# Patient Record
Sex: FEMALE | Race: OTHER | NOT HISPANIC OR LATINO | URBAN - METROPOLITAN AREA
[De-identification: names, ages, dates, MRNs, and addresses within clinical notes are randomized per-mention and may not be internally consistent; named-entity substitution may affect disease eponyms.]

---

## 2018-02-07 VITALS
WEIGHT: 126.1 LBS | OXYGEN SATURATION: 100 % | HEART RATE: 88 BPM | RESPIRATION RATE: 18 BRPM | TEMPERATURE: 97 F | DIASTOLIC BLOOD PRESSURE: 80 MMHG | SYSTOLIC BLOOD PRESSURE: 183 MMHG

## 2018-02-07 PROCEDURE — 99285 EMERGENCY DEPT VISIT HI MDM: CPT | Mod: 25

## 2018-02-07 PROCEDURE — 93010 ELECTROCARDIOGRAM REPORT: CPT

## 2018-02-07 NOTE — ED ADULT TRIAGE NOTE - CHIEF COMPLAINT QUOTE
hypertension, pt for colonoscopy tomorrow been drinking the pre medication solution for colonoscopy been having high bp since 2pm today

## 2018-02-08 ENCOUNTER — INPATIENT (INPATIENT)
Facility: HOSPITAL | Age: 73
LOS: 0 days | Discharge: HOME | DRG: 313 | End: 2018-02-08
Attending: INTERNAL MEDICINE | Admitting: INTERNAL MEDICINE
Payer: COMMERCIAL

## 2018-02-08 ENCOUNTER — TRANSCRIPTION ENCOUNTER (OUTPATIENT)
Age: 73
End: 2018-02-08

## 2018-02-08 VITALS
OXYGEN SATURATION: 100 % | SYSTOLIC BLOOD PRESSURE: 110 MMHG | DIASTOLIC BLOOD PRESSURE: 60 MMHG | HEART RATE: 57 BPM | TEMPERATURE: 98 F | RESPIRATION RATE: 18 BRPM

## 2018-02-08 DIAGNOSIS — F41.9 ANXIETY DISORDER, UNSPECIFIED: ICD-10-CM

## 2018-02-08 DIAGNOSIS — Z98.890 OTHER SPECIFIED POSTPROCEDURAL STATES: Chronic | ICD-10-CM

## 2018-02-08 DIAGNOSIS — R07.89 OTHER CHEST PAIN: ICD-10-CM

## 2018-02-08 DIAGNOSIS — I10 ESSENTIAL (PRIMARY) HYPERTENSION: ICD-10-CM

## 2018-02-08 DIAGNOSIS — K21.9 GASTRO-ESOPHAGEAL REFLUX DISEASE WITHOUT ESOPHAGITIS: ICD-10-CM

## 2018-02-08 LAB
ALBUMIN SERPL ELPH-MCNC: 4 G/DL — SIGNIFICANT CHANGE UP (ref 3.3–5)
ALBUMIN SERPL ELPH-MCNC: 4 G/DL — SIGNIFICANT CHANGE UP (ref 3.3–5)
ALP SERPL-CCNC: 100 U/L — SIGNIFICANT CHANGE UP (ref 40–120)
ALP SERPL-CCNC: 82 U/L — SIGNIFICANT CHANGE UP (ref 40–120)
ALT FLD-CCNC: 15 U/L — SIGNIFICANT CHANGE UP (ref 10–45)
ALT FLD-CCNC: <5 U/L — LOW (ref 10–45)
ANION GAP SERPL CALC-SCNC: 7 MMOL/L — SIGNIFICANT CHANGE UP (ref 5–17)
ANION GAP SERPL CALC-SCNC: 7 MMOL/L — SIGNIFICANT CHANGE UP (ref 5–17)
ANION GAP SERPL CALC-SCNC: 8 MMOL/L — SIGNIFICANT CHANGE UP (ref 5–17)
APTT BLD: 33.6 SEC — SIGNIFICANT CHANGE UP (ref 27.5–37.4)
AST SERPL-CCNC: 23 U/L — SIGNIFICANT CHANGE UP (ref 10–40)
AST SERPL-CCNC: 65 U/L — HIGH (ref 10–40)
BASOPHILS NFR BLD AUTO: 0.6 % — SIGNIFICANT CHANGE UP (ref 0–2)
BASOPHILS NFR BLD AUTO: 0.7 % — SIGNIFICANT CHANGE UP (ref 0–2)
BILIRUB SERPL-MCNC: 0.7 MG/DL — SIGNIFICANT CHANGE UP (ref 0.2–1.2)
BILIRUB SERPL-MCNC: 0.8 MG/DL — SIGNIFICANT CHANGE UP (ref 0.2–1.2)
BUN SERPL-MCNC: 7 MG/DL — SIGNIFICANT CHANGE UP (ref 7–23)
BUN SERPL-MCNC: 8 MG/DL — SIGNIFICANT CHANGE UP (ref 7–23)
BUN SERPL-MCNC: 8 MG/DL — SIGNIFICANT CHANGE UP (ref 7–23)
CALCIUM SERPL-MCNC: 8.9 MG/DL — SIGNIFICANT CHANGE UP (ref 8.4–10.5)
CALCIUM SERPL-MCNC: 9.1 MG/DL — SIGNIFICANT CHANGE UP (ref 8.4–10.5)
CALCIUM SERPL-MCNC: 9.4 MG/DL — SIGNIFICANT CHANGE UP (ref 8.4–10.5)
CHLORIDE SERPL-SCNC: 100 MMOL/L — SIGNIFICANT CHANGE UP (ref 96–108)
CHLORIDE SERPL-SCNC: 105 MMOL/L — SIGNIFICANT CHANGE UP (ref 96–108)
CHLORIDE SERPL-SCNC: 91 MMOL/L — LOW (ref 96–108)
CHOLEST SERPL-MCNC: 196 MG/DL — SIGNIFICANT CHANGE UP (ref 10–199)
CHOLEST SERPL-MCNC: 202 MG/DL — HIGH (ref 10–199)
CHOLEST SERPL-MCNC: 209 MG/DL — HIGH (ref 10–199)
CK MB CFR SERPL CALC: 4.2 NG/ML — SIGNIFICANT CHANGE UP (ref 0–6.7)
CK MB CFR SERPL CALC: 4.7 NG/ML — SIGNIFICANT CHANGE UP (ref 0–6.7)
CK SERPL-CCNC: 140 U/L — SIGNIFICANT CHANGE UP (ref 25–170)
CO2 SERPL-SCNC: 26 MMOL/L — SIGNIFICANT CHANGE UP (ref 22–31)
CO2 SERPL-SCNC: 27 MMOL/L — SIGNIFICANT CHANGE UP (ref 22–31)
CO2 SERPL-SCNC: 29 MMOL/L — SIGNIFICANT CHANGE UP (ref 22–31)
CREAT SERPL-MCNC: 0.58 MG/DL — SIGNIFICANT CHANGE UP (ref 0.5–1.3)
CREAT SERPL-MCNC: 0.67 MG/DL — SIGNIFICANT CHANGE UP (ref 0.5–1.3)
CREAT SERPL-MCNC: 0.69 MG/DL — SIGNIFICANT CHANGE UP (ref 0.5–1.3)
EOSINOPHIL NFR BLD AUTO: 0.6 % — SIGNIFICANT CHANGE UP (ref 0–6)
EOSINOPHIL NFR BLD AUTO: 1.3 % — SIGNIFICANT CHANGE UP (ref 0–6)
GLUCOSE SERPL-MCNC: 108 MG/DL — HIGH (ref 70–99)
GLUCOSE SERPL-MCNC: 117 MG/DL — HIGH (ref 70–99)
GLUCOSE SERPL-MCNC: 93 MG/DL — SIGNIFICANT CHANGE UP (ref 70–99)
HBA1C BLD-MCNC: 5.5 % — SIGNIFICANT CHANGE UP (ref 4–5.6)
HCT VFR BLD CALC: 37.5 % — SIGNIFICANT CHANGE UP (ref 34.5–45)
HCT VFR BLD CALC: 37.6 % — SIGNIFICANT CHANGE UP (ref 34.5–45)
HDLC SERPL-MCNC: 54 MG/DL — SIGNIFICANT CHANGE UP (ref 40–125)
HDLC SERPL-MCNC: 60 MG/DL — SIGNIFICANT CHANGE UP (ref 40–125)
HDLC SERPL-MCNC: 61 MG/DL — SIGNIFICANT CHANGE UP (ref 40–125)
HGB BLD-MCNC: 12.6 G/DL — SIGNIFICANT CHANGE UP (ref 11.5–15.5)
HGB BLD-MCNC: 12.7 G/DL — SIGNIFICANT CHANGE UP (ref 11.5–15.5)
INR BLD: 1.04 — SIGNIFICANT CHANGE UP (ref 0.88–1.16)
LIDOCAIN IGE QN: 58 U/L — SIGNIFICANT CHANGE UP (ref 7–60)
LIPID PNL WITH DIRECT LDL SERPL: 128 MG/DL — SIGNIFICANT CHANGE UP
LIPID PNL WITH DIRECT LDL SERPL: 129 MG/DL — SIGNIFICANT CHANGE UP
LIPID PNL WITH DIRECT LDL SERPL: 136 MG/DL — HIGH
LYMPHOCYTES # BLD AUTO: 21.8 % — SIGNIFICANT CHANGE UP (ref 13–44)
LYMPHOCYTES # BLD AUTO: 36.9 % — SIGNIFICANT CHANGE UP (ref 13–44)
MAGNESIUM SERPL-MCNC: 1.9 MG/DL — SIGNIFICANT CHANGE UP (ref 1.6–2.6)
MAGNESIUM SERPL-MCNC: 1.9 MG/DL — SIGNIFICANT CHANGE UP (ref 1.6–2.6)
MCHC RBC-ENTMCNC: 31 PG — SIGNIFICANT CHANGE UP (ref 27–34)
MCHC RBC-ENTMCNC: 31.6 PG — SIGNIFICANT CHANGE UP (ref 27–34)
MCHC RBC-ENTMCNC: 33.6 G/DL — SIGNIFICANT CHANGE UP (ref 32–36)
MCHC RBC-ENTMCNC: 33.8 G/DL — SIGNIFICANT CHANGE UP (ref 32–36)
MCV RBC AUTO: 92.1 FL — SIGNIFICANT CHANGE UP (ref 80–100)
MCV RBC AUTO: 93.5 FL — SIGNIFICANT CHANGE UP (ref 80–100)
MONOCYTES NFR BLD AUTO: 6.5 % — SIGNIFICANT CHANGE UP (ref 2–14)
MONOCYTES NFR BLD AUTO: 6.6 % — SIGNIFICANT CHANGE UP (ref 2–14)
NEUTROPHILS NFR BLD AUTO: 55.3 % — SIGNIFICANT CHANGE UP (ref 43–77)
NEUTROPHILS NFR BLD AUTO: 69.7 % — SIGNIFICANT CHANGE UP (ref 43–77)
NT-PROBNP SERPL-SCNC: 134 PG/ML — SIGNIFICANT CHANGE UP (ref 0–300)
NT-PROBNP SERPL-SCNC: 434 PG/ML — HIGH (ref 0–300)
PLATELET # BLD AUTO: 253 K/UL — SIGNIFICANT CHANGE UP (ref 150–400)
PLATELET # BLD AUTO: 274 K/UL — SIGNIFICANT CHANGE UP (ref 150–400)
POTASSIUM SERPL-MCNC: 4.1 MMOL/L — SIGNIFICANT CHANGE UP (ref 3.5–5.3)
POTASSIUM SERPL-MCNC: 4.6 MMOL/L — SIGNIFICANT CHANGE UP (ref 3.5–5.3)
POTASSIUM SERPL-MCNC: SIGNIFICANT CHANGE UP MMOL/L (ref 3.5–5.3)
POTASSIUM SERPL-SCNC: 4.1 MMOL/L — SIGNIFICANT CHANGE UP (ref 3.5–5.3)
POTASSIUM SERPL-SCNC: 4.6 MMOL/L — SIGNIFICANT CHANGE UP (ref 3.5–5.3)
POTASSIUM SERPL-SCNC: SIGNIFICANT CHANGE UP MMOL/L (ref 3.5–5.3)
PROT SERPL-MCNC: 7.4 G/DL — SIGNIFICANT CHANGE UP (ref 6–8.3)
PROT SERPL-MCNC: 8.1 G/DL — SIGNIFICANT CHANGE UP (ref 6–8.3)
PROTHROM AB SERPL-ACNC: 11.6 SEC — SIGNIFICANT CHANGE UP (ref 9.8–12.7)
RBC # BLD: 4.02 M/UL — SIGNIFICANT CHANGE UP (ref 3.8–5.2)
RBC # BLD: 4.07 M/UL — SIGNIFICANT CHANGE UP (ref 3.8–5.2)
RBC # FLD: 12 % — SIGNIFICANT CHANGE UP (ref 10.3–16.9)
RBC # FLD: 12.2 % — SIGNIFICANT CHANGE UP (ref 10.3–16.9)
SODIUM SERPL-SCNC: 124 MMOL/L — LOW (ref 135–145)
SODIUM SERPL-SCNC: 136 MMOL/L — SIGNIFICANT CHANGE UP (ref 135–145)
SODIUM SERPL-SCNC: 140 MMOL/L — SIGNIFICANT CHANGE UP (ref 135–145)
TOTAL CHOLESTEROL/HDL RATIO MEASUREMENT: 3.3 RATIO — SIGNIFICANT CHANGE UP (ref 3.3–7.1)
TOTAL CHOLESTEROL/HDL RATIO MEASUREMENT: 3.5 RATIO — SIGNIFICANT CHANGE UP (ref 3.3–7.1)
TOTAL CHOLESTEROL/HDL RATIO MEASUREMENT: 3.6 RATIO — SIGNIFICANT CHANGE UP (ref 3.3–7.1)
TRIGL SERPL-MCNC: 61 MG/DL — SIGNIFICANT CHANGE UP (ref 10–149)
TRIGL SERPL-MCNC: 66 MG/DL — SIGNIFICANT CHANGE UP (ref 10–149)
TRIGL SERPL-MCNC: 72 MG/DL — SIGNIFICANT CHANGE UP (ref 10–149)
TROPONIN T SERPL-MCNC: 0.01 NG/ML — SIGNIFICANT CHANGE UP (ref 0–0.01)
TROPONIN T SERPL-MCNC: <0.01 NG/ML — SIGNIFICANT CHANGE UP (ref 0–0.01)
TSH SERPL-MCNC: 0.58 UIU/ML — SIGNIFICANT CHANGE UP (ref 0.35–4.94)
WBC # BLD: 6.8 K/UL — SIGNIFICANT CHANGE UP (ref 3.8–10.5)
WBC # BLD: 7.5 K/UL — SIGNIFICANT CHANGE UP (ref 3.8–10.5)
WBC # FLD AUTO: 6.8 K/UL — SIGNIFICANT CHANGE UP (ref 3.8–10.5)
WBC # FLD AUTO: 7.5 K/UL — SIGNIFICANT CHANGE UP (ref 3.8–10.5)

## 2018-02-08 PROCEDURE — 71046 X-RAY EXAM CHEST 2 VIEWS: CPT

## 2018-02-08 PROCEDURE — 93005 ELECTROCARDIOGRAM TRACING: CPT

## 2018-02-08 PROCEDURE — 99285 EMERGENCY DEPT VISIT HI MDM: CPT | Mod: 25

## 2018-02-08 PROCEDURE — 93306 TTE W/DOPPLER COMPLETE: CPT | Mod: 26

## 2018-02-08 PROCEDURE — 93306 TTE W/DOPPLER COMPLETE: CPT

## 2018-02-08 PROCEDURE — 99223 1ST HOSP IP/OBS HIGH 75: CPT | Mod: AI

## 2018-02-08 PROCEDURE — 75574 CT ANGIO HRT W/3D IMAGE: CPT

## 2018-02-08 PROCEDURE — 71046 X-RAY EXAM CHEST 2 VIEWS: CPT | Mod: 26

## 2018-02-08 PROCEDURE — 75574 CT ANGIO HRT W/3D IMAGE: CPT | Mod: 26

## 2018-02-08 RX ORDER — NEBIVOLOL HYDROCHLORIDE 5 MG/1
2.5 TABLET ORAL DAILY
Qty: 0 | Refills: 0 | Status: DISCONTINUED | OUTPATIENT
Start: 2018-02-08 | End: 2018-02-08

## 2018-02-08 RX ORDER — ASPIRIN/CALCIUM CARB/MAGNESIUM 324 MG
325 TABLET ORAL ONCE
Qty: 0 | Refills: 0 | Status: COMPLETED | OUTPATIENT
Start: 2018-02-08 | End: 2018-02-08

## 2018-02-08 RX ORDER — HEPARIN SODIUM 5000 [USP'U]/ML
5000 INJECTION INTRAVENOUS; SUBCUTANEOUS EVERY 8 HOURS
Qty: 0 | Refills: 0 | Status: DISCONTINUED | OUTPATIENT
Start: 2018-02-08 | End: 2018-02-08

## 2018-02-08 RX ORDER — PANTOPRAZOLE SODIUM 20 MG/1
40 TABLET, DELAYED RELEASE ORAL
Qty: 0 | Refills: 0 | Status: DISCONTINUED | OUTPATIENT
Start: 2018-02-08 | End: 2018-02-08

## 2018-02-08 RX ORDER — METOPROLOL TARTRATE 50 MG
5 TABLET ORAL ONCE
Qty: 0 | Refills: 0 | Status: COMPLETED | OUTPATIENT
Start: 2018-02-08 | End: 2018-02-08

## 2018-02-08 RX ORDER — SODIUM CHLORIDE 9 MG/ML
1000 INJECTION INTRAMUSCULAR; INTRAVENOUS; SUBCUTANEOUS ONCE
Qty: 0 | Refills: 0 | Status: COMPLETED | OUTPATIENT
Start: 2018-02-08 | End: 2018-02-08

## 2018-02-08 RX ADMIN — SODIUM CHLORIDE 1000 MILLILITER(S): 9 INJECTION INTRAMUSCULAR; INTRAVENOUS; SUBCUTANEOUS at 01:23

## 2018-02-08 RX ADMIN — HEPARIN SODIUM 5000 UNIT(S): 5000 INJECTION INTRAVENOUS; SUBCUTANEOUS at 15:51

## 2018-02-08 RX ADMIN — Medication 325 MILLIGRAM(S): at 04:01

## 2018-02-08 RX ADMIN — HEPARIN SODIUM 5000 UNIT(S): 5000 INJECTION INTRAVENOUS; SUBCUTANEOUS at 05:22

## 2018-02-08 NOTE — ED PROVIDER NOTE - MEDICAL DECISION MAKING DETAILS
supportive care in ED , HTN counseling and IVF as NPO for study supportive care in ED , HTN counseling and IVF as NPO for study albeit patient remains with intermittent cheast discomfort and trop .05 + discussed with cards ( patient sans any prior w/u ) + ekg no acute changes and asa given

## 2018-02-08 NOTE — PROGRESS NOTE ADULT - SUBJECTIVE AND OBJECTIVE BOX
Interventional Cardiology PA Adult Progress Note    Subjective Assessment:  Pt. seen and examined at bedside in the ER. Pt. denies any CP, SOB, dizziness and palpitations.  	  MEDICATIONS:      pantoprazole    Tablet 40 milliGRAM(s) Oral before breakfast  heparin  Injectable 5000 Unit(s) SubCutaneous every 8 hours      	    [PHYSICAL EXAM:  TELEMETRY:  T(C): 36.9 (02-08-18 @ 13:00), Max: 36.9 (02-08-18 @ 13:00)  HR: 68 (02-08-18 @ 13:00) (45 - 88)  BP: 159/68 (02-08-18 @ 13:00) (128/60 - 183/80)  RR: 17 (02-08-18 @ 13:00) (16 - 18)  SpO2: 100% (02-08-18 @ 13:00) (97% - 100%)  Wt(kg): --  I&O's Summary    Height (cm): 152.4 (02-08 @ 06:11)  Weight (kg): 57.2 (02-07 @ 23:39)  BMI (kg/m2): 24.6 (02-08 @ 06:11)  BSA (m2): 1.53 (02-08 @ 06:11)  Turner:  Central/PICC/Mid Line:                                               	    ECG:  	  RADIOLOGY:   DIAGNOSTIC TESTING:  [ ] Echocardiogram:  [ ]  Catheterization:  [ ] Stress Test:    [ ] CORNELIUS  OTHER: 	    LABS:	 	  CARDIAC MARKERS:                                  12.7   6.8   )-----------( 253      ( 08 Feb 2018 11:08 )             37.6     02-08    140  |  105  |  8   ----------------------------<  93  4.1   |  27  |  0.67    Ca    9.4      08 Feb 2018 11:08  Mg     1.9     02-08    TPro  7.4  /  Alb  4.0  /  TBili  0.7  /  DBili  <0.2  /  AST  23  /  ALT  15  /  AlkPhos  100  02-08    proBNP: Serum Pro-Brain Natriuretic Peptide: 434 pg/mL (02-08 @ 11:08)  Serum Pro-Brain Natriuretic Peptide: 134 pg/mL (02-08 @ 01:26)    Lipid Profile:   HgA1c: Hemoglobin A1C, Whole Blood: 5.5 % (02-08 @ 01:27)    TSH: Thyroid Stimulating Hormone, Serum: 0.578 uIU/mL (02-08 @ 11:08)    PT/INR - ( 08 Feb 2018 11:08 )   PT: 11.6 sec;   INR: 1.04          PTT - ( 08 Feb 2018 11:08 )  PTT:33.6 sec    ASSESSMENT/PLAN: 	        DVT ppx:  Dispo: Interventional Cardiology PA Adult Progress Note  CC: CP    Subjective Assessment:    Pt. seen and examined at bedside in the ER. Pt. denies any CP, SOB, dizziness and palpitations.    all 12 ROS otherwise negative per subjective HPI	  MEDICATIONS:      pantoprazole    Tablet 40 milliGRAM(s) Oral before breakfast  heparin  Injectable 5000 Unit(s) SubCutaneous every 8 hours        [PHYSICAL EXAM:  TELEMETRY:  T(C): 36.9 (02-08-18 @ 13:00), Max: 36.9 (02-08-18 @ 13:00)  HR: 68 (02-08-18 @ 13:00) (45 - 88)  BP: 159/68 (02-08-18 @ 13:00) (128/60 - 183/80)  RR: 17 (02-08-18 @ 13:00) (16 - 18)  SpO2: 100% (02-08-18 @ 13:00) (97% - 100%)  Wt(kg): --  I&O's Summary    Height (cm): 152.4 (02-08 @ 06:11)  Weight (kg): 57.2 (02-07 @ 23:39)  BMI (kg/m2): 24.6 (02-08 @ 06:11)  BSA (m2): 1.53 (02-08 @ 06:11)  Turner:  Central/PICC/Mid Line:                                         general:NAD  Neck: no JVD  CV: RRR, s1 and 2 present  Pulm: CTA B/L, no W/R/R  GI: soft, NT/ND, + BS X 4  Extremities: no clubbing, cyanosis and edema  Neuro: AO X 3, no focal deficits noted      	    ECG:  	  RADIOLOGY:   DIAGNOSTIC TESTING:  [ ] Echocardiogram:  [ ]  Catheterization:  [ ] Stress Test:    [ ] CORNELIUS  OTHER: 	    LABS:	 	  CARDIAC MARKERS:                                  12.7   6.8   )-----------( 253      ( 08 Feb 2018 11:08 )             37.6     02-08    140  |  105  |  8   ----------------------------<  93  4.1   |  27  |  0.67    Ca    9.4      08 Feb 2018 11:08  Mg     1.9     02-08    TPro  7.4  /  Alb  4.0  /  TBili  0.7  /  DBili  <0.2  /  AST  23  /  ALT  15  /  AlkPhos  100  02-08    proBNP: Serum Pro-Brain Natriuretic Peptide: 434 pg/mL (02-08 @ 11:08)  Serum Pro-Brain Natriuretic Peptide: 134 pg/mL (02-08 @ 01:26)    Lipid Profile:   HgA1c: Hemoglobin A1C, Whole Blood: 5.5 % (02-08 @ 01:27)    TSH: Thyroid Stimulating Hormone, Serum: 0.578 uIU/mL (02-08 @ 11:08)    PT/INR - ( 08 Feb 2018 11:08 )   PT: 11.6 sec;   INR: 1.04          PTT - ( 08 Feb 2018 11:08 )  PTT:33.6 sec    ASSESSMENT/PLAN: 	        DVT ppx:  Dispo:

## 2018-02-08 NOTE — CONSULT NOTE ADULT - SUBJECTIVE AND OBJECTIVE BOX
RUSH TURNER      Patient is a 72y old  Female who presents with a chief complaint of Chest pressure w/ elevated BP while prepping for Colonoscopy. (08 Feb 2018 06:16)      HPI:  71 y/o female with PMHx of HTN and GERD presents to [facility] ER this morning, 2/8/18, complaining of mild intermittent chest pressure radiating down right arm with elevated BP (pt. checked BP every 15 minutes 160/) while prepping for colonoscopy.  According to patient, she was in her usual good state of health until last night while prepping for colonoscopy, she began to experience anxiety while drinking gastrografin.  Pt. states she tasted sodium from the solution and began to experience anxiety w/chest pressure.   She checked her BP and noted it to elevated 160/70 and became worried since her BP is normally maintained (pt. routinely checks her  BP at least 3-4 times a day 130/60).  Pt. denies any cardiac history and reports last exercise stress test was done three months ago revealing normal findings.  Pt. denies SOB, palpitations, abdominal discomfort and N/V.  In ER ECG reveals NSR@78bpm with non specific ST-T wave changes, Troponin 0.015--> 0.05, CXR reveals no acute pathology seen.  /76 pt. given Lopressor 5mg IV X1 and NS bolus. Pt. admitted to [facility] for telemetry, ECG, serial CE, Echocardiogram and NPO for CTA of coronaries to rule out ACS 2/2 to elevated Troponin.  Monitor BP. (08 Feb 2018 06:16)      Addl  Medical issues:       HEALTH ISSUES - PROBLEM Dx:  GERD (gastroesophageal reflux disease): GERD (gastroesophageal reflux disease)  Hypertension: Hypertension  Atypical chest pain: Atypical chest pain            MEDICATIONS  (STANDING):  heparin  Injectable 5000 Unit(s) SubCutaneous every 8 hours  pantoprazole    Tablet 40 milliGRAM(s) Oral before breakfast    MEDICATIONS  (PRN):          PAST MEDICAL & SURGICAL HISTORY:  GERD (gastroesophageal reflux disease)  Hypertension  History of appendectomy      REVIEW OF SYSTEMS:  [x] As per HPI          Reviewed   no change                            Changes noted  CONSTITUTIONAL: No fever, weight loss, or fatigue  RESPIRATORY: No cough, wheezing, chills or hemoptysis; No Shortness of Breath  CARDIOVASCULAR: CP  GASTROINTESTINAL: No abdominal or epigastric pain. No nausea, vomiting, or hematemesis; No diarrhea or constipation. No melena or hematochezia.  MUSCULOSKELETAL: No joint pain or swelling; No muscle, back, or extremity pain  Neuro:   Grossly  Negative  Psych        Awake  alert  [x] All others negative	  [ ] Unable to obtain      Vital Signs Last 24 Hrs  T(C): 36.9 (08 Feb 2018 13:00), Max: 36.9 (08 Feb 2018 13:00)  T(F): 98.5 (08 Feb 2018 13:00), Max: 98.5 (08 Feb 2018 13:00)  HR: 68 (08 Feb 2018 13:00) (45 - 88)  BP: 159/68 (08 Feb 2018 13:00) (128/60 - 183/80)  BP(mean): --  RR: 17 (08 Feb 2018 13:00) (16 - 18)  SpO2: 100% (08 Feb 2018 13:00) (97% - 100%)    PHYSICAL EXAM:      Constitutional:NAD    Eyes:    ENMT:neg    Neck:supple    Breasts:    Back:neg    Respiratory:clear to A    Cardiovascular:s1s2    Gastrointestinal:soft BS present    Genitourinary:    Rectal:    Extremities:WNL    Vascular:    Neurological:neg    Skin:    Lymph Nodes:    Musculoskeletal:good MS str    Psychiatric:awake/alert                              12.7   6.8   )-----------( 253      ( 08 Feb 2018 11:08 )             37.6     02-08    140  |  105  |  8   ----------------------------<  93  4.1   |  27  |  0.67    Ca    9.4      08 Feb 2018 11:08  Mg     1.9     02-08    TPro  7.4  /  Alb  4.0  /  TBili  0.7  /  DBili  <0.2  /  AST  23  /  ALT  15  /  AlkPhos  100  02-08    CARDIAC MARKERS ( 08 Feb 2018 11:08 )  x     / <0.01 ng/mL / 140 U/L / x     / 4.2 ng/mL  CARDIAC MARKERS ( 08 Feb 2018 02:24 )  x     / 0.05 ng/mL / x     / x     / x      CARDIAC MARKERS ( 08 Feb 2018 01:26 )  x     / 0.01 ng/mL / 256 U/L / x     / 4.7 ng/mL      PT/INR - ( 08 Feb 2018 11:08 )   PT: 11.6 sec;   INR: 1.04          PTT - ( 08 Feb 2018 11:08 )  PTT:33.6 sec  CAPILLARY BLOOD GLUCOSE    < from: 12 Lead ECG (02.07.18 @ 23:39) >    Ventricular Rate 78 BPM    Atrial Rate 78 BPM    P-R Interval 180 ms    QRS Duration 90 ms    Q-T Interval 420 ms    QTC Calculation(Bezet) 478 ms    P Axis 14 degrees    R Axis -3 degrees    T Axis 23 degrees    Diagnosis Line Normal sinus rhythm      < end of copied text >        I&O's Summary          ASSESSMENT/PLAN/RECOMMENDATIONS

## 2018-02-08 NOTE — H&P ADULT - ATTENDING COMMENTS
CC: cp  ROS: 12 point ROS otherwise negative except as stated above in subjective  Agree with above.

## 2018-02-08 NOTE — H&P ADULT - NSHPREVIEWOFSYSTEMS_GEN_ALL_CORE
GENERAL, CONSTITUTIONAL : denies recent weight loss, fever, chills  EYES, VISION: denies changes in vision   EARS, NOSE, THROAT: denies hearing loss  HEART, CARDIOVASCULAR: mild chest pressure, denies arrhythmia, palpitations  RESPIRATORY: Denies cough, SOB, wheezing, PND, orthopnea  GASTROINTESTINAL: Denies abdominal pain, heartburn, bloody stool, dark tarry stool  GENITOURINARY: Denies frequent urination, urgency  MUSCULOSKELETAL denies joint pain or swelling, restricted motion, musculoskeletal pain.   SKIN & INTEGUMENTARY Denies rashes, sores, blisters, blisters, growths.  NEUROLOGICAL: Denies numbness or tingling sensations, sensation loss, burning.   PSYCHIATRIC: admits to nervousness, anxiety, denies depression  ENDOCRINE Denies heat or cold intolerance, excessive thirst  HEMATOLOGIC/LYMPHATIC: Denies abnormal bleeding, bleeding of any kind

## 2018-02-08 NOTE — DISCHARGE NOTE ADULT - CARE PLAN
Principal Discharge DX:	Atypical chest pain  Goal:	please follow up with Dr. Andrea in 1 week to check on your heart.  Assessment and plan of treatment:	You presented with chest pain and the blood work done for your heart was normal this visit, your ECHO(ultrasound of your heart) was normal and the CTA coronaries done for your heart did not show any obstruction in your heart.  Secondary Diagnosis:	Gastroesophageal reflux disease, esophagitis presence not specified  Assessment and plan of treatment:	- please continue to take Pantoprazole.  Secondary Diagnosis:	Hypertension  Assessment and plan of treatment:	your blood pressure has remained stable during your stay here. YOUR ELEVATED BLOOD PRESSURE AT HOME IS MOST LIKELY DUE TO ANXIETY.  please continue to take your Bystolic 2.5 mg daily AND FOLLOW UP WITH YOUR PCP.

## 2018-02-08 NOTE — ED PROVIDER NOTE - ATTENDING CONTRIBUTION TO CARE
72 yof pw L chest pain rad to L arm, while prepping for colonoscopy tonight.  also felt   patient prepping for colonoscopy tomorrow and reports increased anxiety along with noting increased BP and chest / left arm discomfort and thus to ED for care 72 yof pw L chest pain rad to L arm, while prepping for colonoscopy tonight.  also felt anxious but no sob or pleurisy.  no hx of CAD.    agree w/ PA, avss, rrr, cta b/l, will check labs, cardiac panel    noted hyponatremia though other abnormalities noted, will rpt CMP.    noted elevated biomarker, will d/w cards for admission and further workup.

## 2018-02-08 NOTE — H&P ADULT - HISTORY OF PRESENT ILLNESS
73 y/o female with PMHx of HTN and GERD presents to [facility] ER this morning, 2/8/18, complaining of mild intermittent chest pressure radiating down right arm with elevated BP (pt. checked BP every 15 minutes 160/) while prepping for colonoscopy.  According to patient, she was in her usual good state of health until last night while prepping for colonoscopy, she began to experience anxiety while drinking gastrografin.  Pt. states she tasted sodium from the solution and began to experience anxiety w/chest pressure.   She checked her BP and noted it to elevated 160/70 and became worried since her BP is normally maintained (pt. routinely checks her  BP at least 3-4 times a day 130/60).  Pt. denies any cardiac history and reports last exercise stress test was done three months ago revealing normal findings.  Pt. denies SOB, palpitations, abdominal discomfort and N/V.  In ER ECG reveals NSR@78bpm with non specific ST-T wave changes, Troponin 0.015--> 0.05, CXR reveals no acute pathology seen.  BP 71 y/o female with PMHx of HTN and GERD presents to [facility] ER this morning, 2/8/18, complaining of mild intermittent chest pressure radiating down right arm with elevated BP (pt. checked BP every 15 minutes 160/) while prepping for colonoscopy.  According to patient, she was in her usual good state of health until last night while prepping for colonoscopy, she began to experience anxiety while drinking gastrografin.  Pt. states she tasted sodium from the solution and began to experience anxiety w/chest pressure.   She checked her BP and noted it to elevated 160/70 and became worried since her BP is normally maintained (pt. routinely checks her  BP at least 3-4 times a day 130/60).  Pt. denies any cardiac history and reports last exercise stress test was done three months ago revealing normal findings.  Pt. denies SOB, palpitations, abdominal discomfort and N/V.  In ER ECG reveals NSR@78bpm with non specific ST-T wave changes, Troponin 0.015--> 0.05, CXR reveals no acute pathology seen.  /74 pt. given Lopressor 5mg IV X1 and NS bolus. Pt. admitted to [facility] for telemetry, ECG, serial CE, Echocardiogram and NPO for CTA of coronaries to rule out ACS 2/2 to elevated Troponin 71 y/o female with PMHx of HTN and GERD presents to [facility] ER this morning, 2/8/18, complaining of mild intermittent chest pressure radiating down right arm with elevated BP (pt. checked BP every 15 minutes 160/) while prepping for colonoscopy.  According to patient, she was in her usual good state of health until last night while prepping for colonoscopy, she began to experience anxiety while drinking gastrografin.  Pt. states she tasted sodium from the solution and began to experience anxiety w/chest pressure.   She checked her BP and noted it to elevated 160/70 and became worried since her BP is normally maintained (pt. routinely checks her  BP at least 3-4 times a day 130/60).  Pt. denies any cardiac history and reports last exercise stress test was done three months ago revealing normal findings.  Pt. denies SOB, palpitations, abdominal discomfort and N/V.  In ER ECG reveals NSR@78bpm with non specific ST-T wave changes, Troponin 0.015--> 0.05, CXR reveals no acute pathology seen.  /74 pt. given Lopressor 5mg IV X1 and NS bolus. Pt. admitted to [facility] for telemetry, ECG, serial CE, Echocardiogram and NPO for CTA of coronaries to rule out ACS 2/2 to elevated Troponin.  Monitor BP.

## 2018-02-08 NOTE — DISCHARGE NOTE ADULT - CARE PROVIDER_API CALL
Nenita Andrea), Internal Medicine  [address] [id number]  Phone: [phone]  Fax: [phone]

## 2018-02-08 NOTE — ED PROVIDER NOTE - CARE PLAN
Principal Discharge DX:	Atypical chest pain  Secondary Diagnosis:	Hypertension  Secondary Diagnosis:	Anxiety

## 2018-02-08 NOTE — DISCHARGE NOTE ADULT - PLAN OF CARE
please follow up with Dr. Andrea in 1 week to check on your heart. You presented with chest pain and the blood work done for your heart was normal this visit, your ECHO(ultrasound of your heart) was normal and the CTA coronaries done for your heart did not show any obstruction in your heart. - please continue to take Pantoprazole. your blood pressure has remained stable during your stay here. YOUR ELEVATED BLOOD PRESSURE AT HOME IS MOST LIKELY DUE TO ANXIETY.  please continue to take your Bystolic 2.5 mg daily AND FOLLOW UP WITH YOUR PCP.

## 2018-02-08 NOTE — ED ADULT NURSE NOTE - CHPI ED SYMPTOMS NEG
no numbness/no vomiting/no dizziness/no tingling/no fever/no chills/no decreased eating/drinking/no weakness/no nausea

## 2018-02-08 NOTE — PROGRESS NOTE ADULT - PROBLEM SELECTOR PLAN 1
Pt. currently CP free. EKG NSR @ 78 bpm with no ST changes  - trop 0.01>>>0.05 >>0.01  - Pt. NPO for CTA coronaries. f/u results  -ECHO 2/8/17 revealed Normal LV size and wall thickness.LVEF 55-60%

## 2018-02-08 NOTE — DISCHARGE NOTE ADULT - PATIENT PORTAL LINK FT
You can access the [facility], offered by [facility], by registering with the following website: [email or web address]

## 2018-02-08 NOTE — ED PROVIDER NOTE - OBJECTIVE STATEMENT
patient prepping for colonoscopy tomorrow and reports increased anxiety along with noting increased BP and discomforts and thus to ED for care patient prepping for colonoscopy tomorrow and reports increased anxiety along with noting increased BP and chest / left arm discomfort and thus to ED for care

## 2018-02-08 NOTE — DISCHARGE NOTE ADULT - MEDICATION SUMMARY - MEDICATIONS TO TAKE
I will START or STAY ON the medications listed below when I get home from the hospital:    Bystolic 2.5 mg oral tablet  -- 1 tab(s) by mouth 3 to 4 times a week  -- Indication: For blood pressure    pantoprazole 40 mg oral delayed release tablet  -- 1 tab(s) by mouth once a day  -- Indication: For GERD (gastroesophageal reflux disease)

## 2018-02-08 NOTE — H&P ADULT - ASSESSMENT
73 y/o female with PMHx of HTN and GERD presents to [facility] ER this morning, 2/8/18, complaining of mild intermittent chest pressure radiating down right arm with elevated BP (pt. checked BP every 15 minutes 160/) while prepping for colonoscopy.  Pt. admitted to [facility] for telemetry, ECG, serial CE, Echocardiogram and NPO for CTA of coronaries to rule out ACS 2/2 to elevated Troponin.  Monitor BP.

## 2018-02-08 NOTE — H&P ADULT - NSHPLABSRESULTS_GEN_ALL_CORE
12.6   7.5   )-----------( 274      ( 08 Feb 2018 01:27 )             37.5       02-08    136  |  100  |  8   ----------------------------<  108<H>  4.6   |  29  |  0.69    Ca    9.1      08 Feb 2018 02:24  Mg     1.9     02-08    TPro  8.1  /  Alb  4.0  /  TBili  0.8  /  DBili  x   /  AST  65<H>  /  ALT  <5<L>  /  AlkPhos  82  02-08          CARDIAC MARKERS ( 08 Feb 2018 02:24 )  x     / 0.05 ng/mL / x     / x     / x      CARDIAC MARKERS ( 08 Feb 2018 01:26 )  x     / 0.01 ng/mL / 256 U/L / x     / 4.7 ng/mL            EKG: NSR@78 bpm with non specific ST-T wave changes

## 2018-02-08 NOTE — DISCHARGE NOTE ADULT - HOSPITAL COURSE
73 y/o female with PMHx of HTN and GERD presents to [facility] ER this morning, 2/8/18, complaining of mild intermittent chest pressure radiating down right arm with elevated BP (pt. checked BP every 15 minutes 160/) while prepping for colonoscopy.  According to patient, she was in her usual good state of health until last night while prepping for colonoscopy, she began to experience anxiety while drinking gastrografin.  Pt. states she tasted sodium from the solution and began to experience anxiety w/chest pressure.   She checked her BP and noted it to elevated 160/70 and became worried since her BP is normally maintained (pt. routinely checks her  BP at least 3-4 times a day 130/60).  Pt. denies any cardiac history and reports last exercise stress test was done three months ago revealing normal findings.  Pt. denies SOB, palpitations, abdominal discomfort and N/V.  In ER ECG reveals NSR@78bpm with non specific ST-T wave changes, Troponin 0.015--> 0.05, CXR reveals no acute pathology seen.  /76 pt. given Lopressor 5mg IV X1 and NS bolus. Pt. admitted to [facility] for telemetry, ECG, serial CE, Echocardiogram and NPO for CTA of coronaries to rule out ACS 2/2 to elevated Troponin.  Pt. seen and examined at bedside this am, pt. comfortable and denies any CP, SOB, dizziness and palpitations. VSS. Labs stable other than Pts. 1st trop 0.01>>0.05>>0.01 Pt. had an  ECHO 2/8/17 revealed Normal LV size and wall thickness.LVEF 55-60%. CTA coronaries 2/8/18 revealed calcium 198 and revealed non-obstructive CAD. Home meds reviwed with Dr. Andrea and pt. to be d/c on Bystolic 2.5 mg daily and Pantoprazole 40 mg daily. Pt. stable to be d/c as per Dr. andrea and pt. to f/u him in 1 week.

## 2018-02-14 DIAGNOSIS — K21.9 GASTRO-ESOPHAGEAL REFLUX DISEASE WITHOUT ESOPHAGITIS: ICD-10-CM

## 2018-02-14 DIAGNOSIS — R07.89 OTHER CHEST PAIN: ICD-10-CM

## 2018-02-14 DIAGNOSIS — I25.10 ATHEROSCLEROTIC HEART DISEASE OF NATIVE CORONARY ARTERY WITHOUT ANGINA PECTORIS: ICD-10-CM

## 2018-02-14 DIAGNOSIS — F41.9 ANXIETY DISORDER, UNSPECIFIED: ICD-10-CM

## 2018-02-14 DIAGNOSIS — I10 ESSENTIAL (PRIMARY) HYPERTENSION: ICD-10-CM

## 2018-02-15 ENCOUNTER — APPOINTMENT (OUTPATIENT)
Dept: HEART AND VASCULAR | Facility: CLINIC | Age: 73
End: 2018-02-15
Payer: MEDICARE

## 2018-02-15 VITALS
DIASTOLIC BLOOD PRESSURE: 72 MMHG | HEART RATE: 71 BPM | HEIGHT: 58.66 IN | TEMPERATURE: 98.8 F | BODY MASS INDEX: 24.52 KG/M2 | OXYGEN SATURATION: 96 % | SYSTOLIC BLOOD PRESSURE: 151 MMHG | WEIGHT: 120 LBS

## 2018-02-15 PROCEDURE — 93000 ELECTROCARDIOGRAM COMPLETE: CPT

## 2018-02-15 PROCEDURE — 99214 OFFICE O/P EST MOD 30 MIN: CPT | Mod: 25

## 2018-09-05 NOTE — ED ADULT NURSE NOTE - OBJECTIVE STATEMENT
Problem List Items Addressed This Visit     iRya Stiles      Other Visit Diagnoses     Encounter for wellness examination    -  Primary     discussed preventative health, cancer screening, immunizations, and safety issues      Screen for colon cancer        Relevant Orders    Ambulatory referral to Colorectal Surgery    Need for shingles vaccine        Relevant Medications    Zoster Vac Recomb Adjuvanted ([address]) 50 MCG SUSR
71 yo F c.o hypertension. Pt states "I was home and was taking medication for a colonoscopy tomorrow, I took it from 2pm - 6pm around 630-7pm I took my blood pressure and noticed that it was too high at 190/80.  Pt denies headache or blurred vision at this time. Pt states that she is also experiencing chest pain that radiates down left arm at this time. Pt EKG in chart with SR noted. Pt place on telemetry and cont pulse ox monitoring at this time with SR noted. Pt noted to be highly anxious at this time with  at bedside.  Pt denies chest pain, sob, numbness or tingling to bl upper or lower extremities, headache at this time. Pt stable and will continue to monitor.

## 2019-12-11 NOTE — ED PROVIDER NOTE - CARDIAC, MLM
Status: Pt admitted for severe headache, photosensitivity, and nausea. Diagnosis of prepontine, premedullary cistern subarachnoid hemorrhage.  Vitals: VSS on RA.  Neuros: A&O x 4. Photophobic headache otherwise intact. Strengths 5/5. Denies N/T.  IV: PIV SL.  Resp/trach: LS clear.  Diet: Regular diet - tolerating well.  Bowel status: No BM this shift. BS+  : Voiding spontaneously  Skin: Intact. Bruising throughout  Pain: HA controlled with oxycodone and scheduled tylenol.  Activity: Up ad rogerio.  Social: Wife at bedside, involved with cares.  Plan: Will continue to monitor and follow POC.   Normal rate, regular rhythm.  Heart sounds S1, S2.  No murmurs, rubs or gallops.

## 2020-01-23 ENCOUNTER — TRANSCRIPTION ENCOUNTER (OUTPATIENT)
Age: 75
End: 2020-01-23

## 2020-10-14 ENCOUNTER — EMERGENCY (EMERGENCY)
Facility: HOSPITAL | Age: 75
LOS: 1 days | Discharge: HOME | End: 2020-10-14
Attending: EMERGENCY MEDICINE | Admitting: EMERGENCY MEDICINE
Payer: MEDICARE

## 2020-10-14 VITALS
WEIGHT: 126.99 LBS | HEART RATE: 83 BPM | RESPIRATION RATE: 17 BRPM | SYSTOLIC BLOOD PRESSURE: 185 MMHG | OXYGEN SATURATION: 99 % | DIASTOLIC BLOOD PRESSURE: 72 MMHG | TEMPERATURE: 98 F

## 2020-10-14 VITALS — SYSTOLIC BLOOD PRESSURE: 140 MMHG | RESPIRATION RATE: 16 BRPM | DIASTOLIC BLOOD PRESSURE: 79 MMHG | HEART RATE: 70 BPM

## 2020-10-14 DIAGNOSIS — R51.9 HEADACHE, UNSPECIFIED: ICD-10-CM

## 2020-10-14 DIAGNOSIS — Z98.890 OTHER SPECIFIED POSTPROCEDURAL STATES: Chronic | ICD-10-CM

## 2020-10-14 PROBLEM — I10 ESSENTIAL (PRIMARY) HYPERTENSION: Chronic | Status: ACTIVE | Noted: 2018-02-08

## 2020-10-14 PROBLEM — K21.9 GASTRO-ESOPHAGEAL REFLUX DISEASE WITHOUT ESOPHAGITIS: Chronic | Status: ACTIVE | Noted: 2018-02-08

## 2020-10-14 LAB
ALBUMIN SERPL ELPH-MCNC: 4.6 G/DL — SIGNIFICANT CHANGE UP (ref 3.3–5)
ALP SERPL-CCNC: 102 U/L — SIGNIFICANT CHANGE UP (ref 40–120)
ALT FLD-CCNC: 18 U/L — SIGNIFICANT CHANGE UP (ref 10–45)
ANION GAP SERPL CALC-SCNC: 13 MMOL/L — SIGNIFICANT CHANGE UP (ref 5–17)
APPEARANCE UR: CLEAR — SIGNIFICANT CHANGE UP
AST SERPL-CCNC: 23 U/L — SIGNIFICANT CHANGE UP (ref 10–40)
BACTERIA # UR AUTO: PRESENT /HPF
BASOPHILS # BLD AUTO: 0.03 K/UL — SIGNIFICANT CHANGE UP (ref 0–0.2)
BASOPHILS NFR BLD AUTO: 0.4 % — SIGNIFICANT CHANGE UP (ref 0–2)
BILIRUB SERPL-MCNC: 0.5 MG/DL — SIGNIFICANT CHANGE UP (ref 0.2–1.2)
BILIRUB UR-MCNC: NEGATIVE — SIGNIFICANT CHANGE UP
BUN SERPL-MCNC: 10 MG/DL — SIGNIFICANT CHANGE UP (ref 7–23)
CALCIUM SERPL-MCNC: 9.5 MG/DL — SIGNIFICANT CHANGE UP (ref 8.4–10.5)
CHLORIDE SERPL-SCNC: 98 MMOL/L — SIGNIFICANT CHANGE UP (ref 96–108)
CO2 SERPL-SCNC: 25 MMOL/L — SIGNIFICANT CHANGE UP (ref 22–31)
COLOR SPEC: YELLOW — SIGNIFICANT CHANGE UP
CREAT SERPL-MCNC: 0.65 MG/DL — SIGNIFICANT CHANGE UP (ref 0.5–1.3)
DIFF PNL FLD: ABNORMAL
EOSINOPHIL # BLD AUTO: 0.02 K/UL — SIGNIFICANT CHANGE UP (ref 0–0.5)
EOSINOPHIL NFR BLD AUTO: 0.3 % — SIGNIFICANT CHANGE UP (ref 0–6)
EPI CELLS # UR: SIGNIFICANT CHANGE UP /HPF (ref 0–5)
GLUCOSE SERPL-MCNC: 125 MG/DL — HIGH (ref 70–99)
GLUCOSE UR QL: NEGATIVE — SIGNIFICANT CHANGE UP
HCT VFR BLD CALC: 41.1 % — SIGNIFICANT CHANGE UP (ref 34.5–45)
HGB BLD-MCNC: 13.6 G/DL — SIGNIFICANT CHANGE UP (ref 11.5–15.5)
IMM GRANULOCYTES NFR BLD AUTO: 0.3 % — SIGNIFICANT CHANGE UP (ref 0–1.5)
KETONES UR-MCNC: NEGATIVE — SIGNIFICANT CHANGE UP
LEUKOCYTE ESTERASE UR-ACNC: NEGATIVE — SIGNIFICANT CHANGE UP
LYMPHOCYTES # BLD AUTO: 0.96 K/UL — LOW (ref 1–3.3)
LYMPHOCYTES # BLD AUTO: 12.4 % — LOW (ref 13–44)
MCHC RBC-ENTMCNC: 31.6 PG — SIGNIFICANT CHANGE UP (ref 27–34)
MCHC RBC-ENTMCNC: 33.1 GM/DL — SIGNIFICANT CHANGE UP (ref 32–36)
MCV RBC AUTO: 95.4 FL — SIGNIFICANT CHANGE UP (ref 80–100)
MONOCYTES # BLD AUTO: 0.27 K/UL — SIGNIFICANT CHANGE UP (ref 0–0.9)
MONOCYTES NFR BLD AUTO: 3.5 % — SIGNIFICANT CHANGE UP (ref 2–14)
NEUTROPHILS # BLD AUTO: 6.46 K/UL — SIGNIFICANT CHANGE UP (ref 1.8–7.4)
NEUTROPHILS NFR BLD AUTO: 83.1 % — HIGH (ref 43–77)
NITRITE UR-MCNC: NEGATIVE — SIGNIFICANT CHANGE UP
NRBC # BLD: 0 /100 WBCS — SIGNIFICANT CHANGE UP (ref 0–0)
PH UR: 6.5 — SIGNIFICANT CHANGE UP (ref 5–8)
PLATELET # BLD AUTO: 238 K/UL — SIGNIFICANT CHANGE UP (ref 150–400)
POTASSIUM SERPL-MCNC: 3.6 MMOL/L — SIGNIFICANT CHANGE UP (ref 3.5–5.3)
POTASSIUM SERPL-SCNC: 3.6 MMOL/L — SIGNIFICANT CHANGE UP (ref 3.5–5.3)
PROT SERPL-MCNC: 7.8 G/DL — SIGNIFICANT CHANGE UP (ref 6–8.3)
PROT UR-MCNC: NEGATIVE MG/DL — SIGNIFICANT CHANGE UP
RBC # BLD: 4.31 M/UL — SIGNIFICANT CHANGE UP (ref 3.8–5.2)
RBC # FLD: 11.6 % — SIGNIFICANT CHANGE UP (ref 10.3–14.5)
RBC CASTS # UR COMP ASSIST: < 5 /HPF — SIGNIFICANT CHANGE UP
SODIUM SERPL-SCNC: 136 MMOL/L — SIGNIFICANT CHANGE UP (ref 135–145)
SP GR SPEC: <=1.005 — SIGNIFICANT CHANGE UP (ref 1–1.03)
TROPONIN T SERPL-MCNC: <0.01 NG/ML — SIGNIFICANT CHANGE UP (ref 0–0.01)
UROBILINOGEN FLD QL: 0.2 E.U./DL — SIGNIFICANT CHANGE UP
WBC # BLD: 7.76 K/UL — SIGNIFICANT CHANGE UP (ref 3.8–10.5)
WBC # FLD AUTO: 7.76 K/UL — SIGNIFICANT CHANGE UP (ref 3.8–10.5)
WBC UR QL: < 5 /HPF — SIGNIFICANT CHANGE UP

## 2020-10-14 PROCEDURE — 36415 COLL VENOUS BLD VENIPUNCTURE: CPT

## 2020-10-14 PROCEDURE — 93010 ELECTROCARDIOGRAM REPORT: CPT

## 2020-10-14 PROCEDURE — 99285 EMERGENCY DEPT VISIT HI MDM: CPT

## 2020-10-14 PROCEDURE — 84484 ASSAY OF TROPONIN QUANT: CPT

## 2020-10-14 PROCEDURE — 80053 COMPREHEN METABOLIC PANEL: CPT

## 2020-10-14 PROCEDURE — 81001 URINALYSIS AUTO W/SCOPE: CPT

## 2020-10-14 PROCEDURE — 70450 CT HEAD/BRAIN W/O DYE: CPT | Mod: 26

## 2020-10-14 PROCEDURE — 93005 ELECTROCARDIOGRAM TRACING: CPT

## 2020-10-14 PROCEDURE — 99284 EMERGENCY DEPT VISIT MOD MDM: CPT | Mod: 25

## 2020-10-14 PROCEDURE — 96374 THER/PROPH/DIAG INJ IV PUSH: CPT

## 2020-10-14 PROCEDURE — 85025 COMPLETE CBC W/AUTO DIFF WBC: CPT

## 2020-10-14 PROCEDURE — 70450 CT HEAD/BRAIN W/O DYE: CPT

## 2020-10-14 PROCEDURE — 96375 TX/PRO/DX INJ NEW DRUG ADDON: CPT

## 2020-10-14 RX ORDER — KETOROLAC TROMETHAMINE 30 MG/ML
15 SYRINGE (ML) INJECTION ONCE
Refills: 0 | Status: DISCONTINUED | OUTPATIENT
Start: 2020-10-14 | End: 2020-10-14

## 2020-10-14 RX ORDER — METOCLOPRAMIDE HCL 10 MG
10 TABLET ORAL ONCE
Refills: 0 | Status: COMPLETED | OUTPATIENT
Start: 2020-10-14 | End: 2020-10-14

## 2020-10-14 RX ORDER — ACETAMINOPHEN 500 MG
650 TABLET ORAL ONCE
Refills: 0 | Status: COMPLETED | OUTPATIENT
Start: 2020-10-14 | End: 2020-10-14

## 2020-10-14 RX ORDER — SODIUM CHLORIDE 9 MG/ML
1000 INJECTION INTRAMUSCULAR; INTRAVENOUS; SUBCUTANEOUS ONCE
Refills: 0 | Status: COMPLETED | OUTPATIENT
Start: 2020-10-14 | End: 2020-10-14

## 2020-10-14 RX ADMIN — Medication 15 MILLIGRAM(S): at 12:48

## 2020-10-14 RX ADMIN — Medication 104 MILLIGRAM(S): at 11:07

## 2020-10-14 RX ADMIN — Medication 650 MILLIGRAM(S): at 11:07

## 2020-10-14 RX ADMIN — SODIUM CHLORIDE 1000 MILLILITER(S): 9 INJECTION INTRAMUSCULAR; INTRAVENOUS; SUBCUTANEOUS at 11:08

## 2020-10-14 NOTE — ED PROVIDER NOTE - CARE PROVIDER_API CALL
Melchor Babin  NEUROLOGY  [address] [place] [address]  Phone: [phone]  Fax: [phone]  Follow Up Time:

## 2020-10-14 NOTE — ED PROVIDER NOTE - CONSTITUTIONAL MENTATION
Low vitamin D 12    Recommended   Ergocalciferol 50K once weekly for 12 weeks  Daily 800 - 1000 IUs daily   Add calcium supplement to her diet   Has yogurt, milk almost daily      Call sooner if anything changes  Patient agrees    Repeat labs in 6 months -- main [place]   F/u with me one week later      awake/alert/oriented to person, place, time/situation

## 2020-10-14 NOTE — ED PROVIDER NOTE - OBJECTIVE STATEMENT
The pt is a 76 y/o F, who presents to ED c/o r sided h/a x 1 wk. Pt states pain is 8/10, constant and throbbing, non radiating, has been taking tyl w/good relief - but h/a returns when meds wear off, also c/o elevated bp - states that takes bystolic 2.5mg. Denies visual changes, neck pain, dizziness, syncope, n/v/d, cp, sob, fevers, chills, falls, injury

## 2020-10-14 NOTE — ED PROVIDER NOTE - PATIENT PORTAL LINK FT
You can access the FollowMyHealth Patient Portal offered by [facility] by registering at the following website: [email or web address]. By joining [employer]’s FollowMyHealth portal, you will also be able to view your health information using other applications (apps) compatible with our system.

## 2020-10-14 NOTE — ED ADULT NURSE NOTE - NSIMPLEMENTINTERV_GEN_ALL_ED
Implemented All Universal Safety Interventions:  [place] to call system. Call bell, personal items and telephone within reach. Instruct patient to call for assistance. Room bathroom lighting operational. Non-slip footwear when patient is off stretcher. Physically safe environment: no spills, clutter or unnecessary equipment. Stretcher in lowest position, wheels locked, appropriate side rails in place.

## 2020-10-14 NOTE — ED PROVIDER NOTE - ATTENDING CONTRIBUTION TO CARE
76 y/o F with PMH of HTN presents to ED c/o r sided h/a x 1 wk. Pt states pain is 8/10, constant, gradual onset and throbbing, non radiating, has been taking tyl w/good relief - but h/a returns when meds wear off. Pt also c/o elevated bp - states that takes bystolic 2.5mg. Denies visual changes, neck pain, dizziness, syncope, n/v/d, cp, sob, fevers, chills, falls, injury. Pt AAO, NAD, RRR, CTA b/l, abd: soft and NT , Neuro is grossly normal. Given meds in ed with elief/ resolution, ct head neg, stable for dc and advised prn pain meds, to continue her bp meds. To f/up outpt with PCP. Strict return precautions given.

## 2020-10-14 NOTE — ED PROVIDER NOTE - CLINICAL SUMMARY MEDICAL DECISION MAKING FREE TEXT BOX
pt c/o h/a x 1 wk w/o any acute changes today, not worst h/a of life, not thunderclap, pt states that her bp goes up when she has the pain, recently had bystolic dose lowered by pmd, admits to relief of pain w/tyl, non focal neuro exam, well appearing, given meds in ed w/h/a relief/ resolution, ct head neg, stable for dc, prn pain meds, to con't her bp meds, understands to f/u w/pmd for eval and neuro, strict return precautions given

## 2020-10-14 NOTE — ED PROVIDER NOTE - NSFOLLOWUPINSTRUCTIONS_ED_ALL_ED_FT
take your blood pressure mediations as prescribed, take pain medication if needed for headache, please follow up with your pmd for evaluation, and neurologist for headache    Headache    A headache is pain or discomfort felt around the head or neck area. The specific cause of a headache may not be found as there are many types including tension headaches, migraine headaches, and cluster headaches. Watch your condition for any changes. Things you can do to manage your pain include taking over the counter and prescription medications as instructed by your health care provider, lying down in a dark quiet room, limiting stress, getting regular sleep, and refraining from alcohol and tobacco products.    SEEK IMMEDIATE MEDICAL CARE IF YOU HAVE ANY OF THE FOLLOWING SYMPTOMS: fever, vomiting, stiff neck, loss of vision, problems with speech, muscle weakness, loss of balance, trouble walking, passing out, or confusion.

## 2020-10-14 NOTE — ED ADULT TRIAGE NOTE - CHIEF COMPLAINT QUOTE
c/o intermittent headache, photophobia x 8 days.  Patient states she has not missed her blood pressure meds.  Denies head trauma, dizziness, unsteady gait, chest pain, sob.

## 2020-10-14 NOTE — ED PROVIDER NOTE - MDM ORDERS SUBMITTED SELECTION
Medications/Labs/Imaging Studies Hydroxyzine Pregnancy And Lactation Text: This medication is not safe during pregnancy and should not be taken. It is also excreted in breast milk and breast feeding isn't recommended.

## 2020-10-14 NOTE — ED ADULT NURSE NOTE - OBJECTIVE STATEMENT
Pt c/o HA x8 days. Hx of HTN, 180/80's. Pt stAtes she is compliant with medication. +photophobia. Denies CP or SOB. R 20g PIV placed, labs collected. AAOX3, MAEx4.

## 2020-10-22 ENCOUNTER — APPOINTMENT (OUTPATIENT)
Dept: NEUROLOGY | Facility: CLINIC | Age: 75
End: 2020-10-22
Payer: MEDICARE

## 2020-10-22 VITALS
BODY MASS INDEX: 25.95 KG/M2 | WEIGHT: 127 LBS | OXYGEN SATURATION: 99 % | HEIGHT: 58.66 IN | TEMPERATURE: 96.9 F | DIASTOLIC BLOOD PRESSURE: 72 MMHG | HEART RATE: 70 BPM | SYSTOLIC BLOOD PRESSURE: 153 MMHG

## 2020-10-22 PROCEDURE — 99204 OFFICE O/P NEW MOD 45 MIN: CPT

## 2020-10-29 ENCOUNTER — EMERGENCY (EMERGENCY)
Facility: HOSPITAL | Age: 75
LOS: 1 days | Discharge: HOME | End: 2020-10-29
Attending: EMERGENCY MEDICINE | Admitting: EMERGENCY MEDICINE
Payer: MEDICARE

## 2020-10-29 VITALS
HEIGHT: 59 IN | TEMPERATURE: 99 F | WEIGHT: 125 LBS | SYSTOLIC BLOOD PRESSURE: 174 MMHG | HEART RATE: 78 BPM | DIASTOLIC BLOOD PRESSURE: 77 MMHG | OXYGEN SATURATION: 98 % | RESPIRATION RATE: 18 BRPM

## 2020-10-29 VITALS
HEART RATE: 67 BPM | TEMPERATURE: 98 F | RESPIRATION RATE: 16 BRPM | OXYGEN SATURATION: 99 % | DIASTOLIC BLOOD PRESSURE: 66 MMHG | SYSTOLIC BLOOD PRESSURE: 130 MMHG

## 2020-10-29 DIAGNOSIS — R10.13 EPIGASTRIC PAIN: ICD-10-CM

## 2020-10-29 DIAGNOSIS — I10 ESSENTIAL (PRIMARY) HYPERTENSION: ICD-10-CM

## 2020-10-29 DIAGNOSIS — Z98.890 OTHER SPECIFIED POSTPROCEDURAL STATES: Chronic | ICD-10-CM

## 2020-10-29 LAB
ALBUMIN SERPL ELPH-MCNC: 4.3 G/DL — SIGNIFICANT CHANGE UP (ref 3.3–5)
ALP SERPL-CCNC: 96 U/L — SIGNIFICANT CHANGE UP (ref 40–120)
ALT FLD-CCNC: 15 U/L — SIGNIFICANT CHANGE UP (ref 10–45)
ANION GAP SERPL CALC-SCNC: 10 MMOL/L — SIGNIFICANT CHANGE UP (ref 5–17)
APTT BLD: 39.8 SEC — HIGH (ref 27.5–35.5)
AST SERPL-CCNC: 20 U/L — SIGNIFICANT CHANGE UP (ref 10–40)
BASOPHILS # BLD AUTO: 0.04 K/UL — SIGNIFICANT CHANGE UP (ref 0–0.2)
BASOPHILS NFR BLD AUTO: 0.5 % — SIGNIFICANT CHANGE UP (ref 0–2)
BILIRUB SERPL-MCNC: 0.4 MG/DL — SIGNIFICANT CHANGE UP (ref 0.2–1.2)
BUN SERPL-MCNC: 10 MG/DL — SIGNIFICANT CHANGE UP (ref 7–23)
CALCIUM SERPL-MCNC: 9.6 MG/DL — SIGNIFICANT CHANGE UP (ref 8.4–10.5)
CHLORIDE SERPL-SCNC: 100 MMOL/L — SIGNIFICANT CHANGE UP (ref 96–108)
CK MB CFR SERPL CALC: 2.8 NG/ML — SIGNIFICANT CHANGE UP (ref 0–6.7)
CK SERPL-CCNC: 107 U/L — SIGNIFICANT CHANGE UP (ref 25–170)
CO2 SERPL-SCNC: 27 MMOL/L — SIGNIFICANT CHANGE UP (ref 22–31)
CREAT SERPL-MCNC: 0.71 MG/DL — SIGNIFICANT CHANGE UP (ref 0.5–1.3)
EOSINOPHIL # BLD AUTO: 0.04 K/UL — SIGNIFICANT CHANGE UP (ref 0–0.5)
EOSINOPHIL NFR BLD AUTO: 0.5 % — SIGNIFICANT CHANGE UP (ref 0–6)
GLUCOSE SERPL-MCNC: 120 MG/DL — HIGH (ref 70–99)
HCT VFR BLD CALC: 38.6 % — SIGNIFICANT CHANGE UP (ref 34.5–45)
HGB BLD-MCNC: 12.9 G/DL — SIGNIFICANT CHANGE UP (ref 11.5–15.5)
IMM GRANULOCYTES NFR BLD AUTO: 0.3 % — SIGNIFICANT CHANGE UP (ref 0–1.5)
INR BLD: 1.03 — SIGNIFICANT CHANGE UP (ref 0.88–1.16)
LYMPHOCYTES # BLD AUTO: 1.42 K/UL — SIGNIFICANT CHANGE UP (ref 1–3.3)
LYMPHOCYTES # BLD AUTO: 19.3 % — SIGNIFICANT CHANGE UP (ref 13–44)
MCHC RBC-ENTMCNC: 31.8 PG — SIGNIFICANT CHANGE UP (ref 27–34)
MCHC RBC-ENTMCNC: 33.4 GM/DL — SIGNIFICANT CHANGE UP (ref 32–36)
MCV RBC AUTO: 95.1 FL — SIGNIFICANT CHANGE UP (ref 80–100)
MONOCYTES # BLD AUTO: 0.37 K/UL — SIGNIFICANT CHANGE UP (ref 0–0.9)
MONOCYTES NFR BLD AUTO: 5 % — SIGNIFICANT CHANGE UP (ref 2–14)
NEUTROPHILS # BLD AUTO: 5.46 K/UL — SIGNIFICANT CHANGE UP (ref 1.8–7.4)
NEUTROPHILS NFR BLD AUTO: 74.4 % — SIGNIFICANT CHANGE UP (ref 43–77)
NRBC # BLD: 0 /100 WBCS — SIGNIFICANT CHANGE UP (ref 0–0)
PLATELET # BLD AUTO: 227 K/UL — SIGNIFICANT CHANGE UP (ref 150–400)
POTASSIUM SERPL-MCNC: 3.8 MMOL/L — SIGNIFICANT CHANGE UP (ref 3.5–5.3)
POTASSIUM SERPL-SCNC: 3.8 MMOL/L — SIGNIFICANT CHANGE UP (ref 3.5–5.3)
PROT SERPL-MCNC: 7.3 G/DL — SIGNIFICANT CHANGE UP (ref 6–8.3)
PROTHROM AB SERPL-ACNC: 12.3 SEC — SIGNIFICANT CHANGE UP (ref 10.6–13.6)
RBC # BLD: 4.06 M/UL — SIGNIFICANT CHANGE UP (ref 3.8–5.2)
RBC # FLD: 11.4 % — SIGNIFICANT CHANGE UP (ref 10.3–14.5)
SODIUM SERPL-SCNC: 137 MMOL/L — SIGNIFICANT CHANGE UP (ref 135–145)
TROPONIN T SERPL-MCNC: <0.01 NG/ML — SIGNIFICANT CHANGE UP (ref 0–0.01)
WBC # BLD: 7.35 K/UL — SIGNIFICANT CHANGE UP (ref 3.8–10.5)
WBC # FLD AUTO: 7.35 K/UL — SIGNIFICANT CHANGE UP (ref 3.8–10.5)

## 2020-10-29 PROCEDURE — 99285 EMERGENCY DEPT VISIT HI MDM: CPT

## 2020-10-29 PROCEDURE — 99284 EMERGENCY DEPT VISIT MOD MDM: CPT | Mod: 25

## 2020-10-29 PROCEDURE — 85025 COMPLETE CBC W/AUTO DIFF WBC: CPT

## 2020-10-29 PROCEDURE — 93010 ELECTROCARDIOGRAM REPORT: CPT

## 2020-10-29 PROCEDURE — 84484 ASSAY OF TROPONIN QUANT: CPT

## 2020-10-29 PROCEDURE — 96375 TX/PRO/DX INJ NEW DRUG ADDON: CPT | Mod: XU

## 2020-10-29 PROCEDURE — 93005 ELECTROCARDIOGRAM TRACING: CPT

## 2020-10-29 PROCEDURE — 36415 COLL VENOUS BLD VENIPUNCTURE: CPT

## 2020-10-29 PROCEDURE — 82550 ASSAY OF CK (CPK): CPT

## 2020-10-29 PROCEDURE — 71275 CT ANGIOGRAPHY CHEST: CPT

## 2020-10-29 PROCEDURE — 85730 THROMBOPLASTIN TIME PARTIAL: CPT

## 2020-10-29 PROCEDURE — 71275 CT ANGIOGRAPHY CHEST: CPT | Mod: 26

## 2020-10-29 PROCEDURE — 80053 COMPREHEN METABOLIC PANEL: CPT

## 2020-10-29 PROCEDURE — 85610 PROTHROMBIN TIME: CPT

## 2020-10-29 PROCEDURE — 74174 CTA ABD&PLVS W/CONTRAST: CPT

## 2020-10-29 PROCEDURE — 96374 THER/PROPH/DIAG INJ IV PUSH: CPT | Mod: XU

## 2020-10-29 PROCEDURE — 74174 CTA ABD&PLVS W/CONTRAST: CPT | Mod: 26

## 2020-10-29 PROCEDURE — 82553 CREATINE MB FRACTION: CPT

## 2020-10-29 RX ORDER — FAMOTIDINE 10 MG/ML
20 INJECTION INTRAVENOUS ONCE
Refills: 0 | Status: COMPLETED | OUTPATIENT
Start: 2020-10-29 | End: 2020-10-29

## 2020-10-29 RX ORDER — ACETAMINOPHEN 500 MG
650 TABLET ORAL ONCE
Refills: 0 | Status: COMPLETED | OUTPATIENT
Start: 2020-10-29 | End: 2020-10-29

## 2020-10-29 RX ORDER — ONDANSETRON 8 MG/1
4 TABLET, FILM COATED ORAL ONCE
Refills: 0 | Status: COMPLETED | OUTPATIENT
Start: 2020-10-29 | End: 2020-10-29

## 2020-10-29 RX ADMIN — ONDANSETRON 4 MILLIGRAM(S): 8 TABLET, FILM COATED ORAL at 16:53

## 2020-10-29 RX ADMIN — Medication 650 MILLIGRAM(S): at 16:53

## 2020-10-29 RX ADMIN — FAMOTIDINE 20 MILLIGRAM(S): 10 INJECTION INTRAVENOUS at 16:53

## 2020-10-29 NOTE — ED ADULT NURSE NOTE - CHPI ED NUR SYMPTOMS NEG
no fever/no nausea/no dysuria/no hematuria/no blood in stool/no burning urination/no chills/no vomiting/no diarrhea/no abdominal distension

## 2020-10-29 NOTE — ED PROVIDER NOTE - NS ED ROS FT
Constitutional: no fever, chills  Abd: + epigastric pain, nausea  no vomiting  Cardiac: + chest pain to the touch, elevated systolic BP  no SOB  All other ROS neg except as per HPI

## 2020-10-29 NOTE — ED PROVIDER NOTE - PATIENT PORTAL LINK FT
You can access the FollowMyHealth Patient Portal offered by [facility] by registering at the following website: [email or web address]. By joining [employer]’s FollowMyHealth portal, you will also be able to view your health information using other applications (apps) compatible with our system.

## 2020-10-29 NOTE — ED PROVIDER NOTE - ATTENDING CONTRIBUTION TO CARE
here with elevated bp readings at home (has journal and checks very frequently) and intermittent chest/abdomen/back pain associated with feeling hot/flushed. exam non focal, abdomen soft/non tender. labs done with normal trop making acs unlikely given a few days of pain that is reproducible with palpation. cta of chest/a/p done without dissection or acute abnormality. plan prn tylenol, continue current meds, pmd f/u. return precautions discussed

## 2020-10-29 NOTE — ED ADULT TRIAGE NOTE - CHIEF COMPLAINT QUOTE
Pt presents as a walk-in, c/o elevated BP, abdominal pain radiating to back and shoulders, states nausea but no vomiting. PT denies CP/SOB

## 2020-10-29 NOTE — ED PROVIDER NOTE - OBJECTIVE STATEMENT
74 y/o F with PMHx HTN, GERD, HLD, presents to the ED c/o right epigastric pain, radiating up to the right shoulder and back, for the past 3 days. Pt states the pain is sharp and intermittent, lasting about 3 minutes at a time, associated with 'heat' throughout her body and nausea, but no vomiting. Pt also reports intermittent, sharp, left sided chest pain x 2 days, which occurs only when touching her chest. States today she was recording her BP, which was going up into the 160s and 170s systolic, which prompted her to come to the ED for evaluation. Denies fever, chills, cough, SOB, vomiting, numbness, tingling, focal weakness, headache, or dizziness.

## 2020-10-29 NOTE — ED PROVIDER NOTE - CHPI ED SYMPTOMS NEG
no cough, no SOB, no numbness, no tingling, no weakness, no headache, no dizziness/no fever/no chills/no vomiting

## 2020-10-29 NOTE — ED ADULT NURSE NOTE - NSIMPLEMENTINTERV_GEN_ALL_ED
Implemented All Universal Safety Interventions:  [place] to call system. Call bell, personal items and telephone within reach. Instruct patient to call for assistance. Room bathroom lighting operational. Non-slip footwear when patient is off stretcher. Physically safe environment: no spills, clutter or unnecessary equipment. Stretcher in lowest position, wheels locked, appropriate side rails in place.

## 2020-10-29 NOTE — ED ADULT NURSE NOTE - OBJECTIVE STATEMENT
pt is a 74 y/o F arriving to ED for c/c "my stomach and chest hurt". Hx HTN, GERD, HLD, presents to the ED c/o epigastric pain, radiating up to the Lt chest, shoulder and back, for the past 3 days. Pt states the pain is sharp and intermittent, lasting about 3 minutes at a time, associated with 'heat' throughout her body and nausea, but no vomiting. Pt also reports intermittent, sharp, left sided chest pain x 2 days, which occurs only when touching her chest. States today she was recording her BP, which was going up into the 160s and 170s systolic, which prompted her to come to the ED for evaluation. Denies fever, chills, cough, SOB, vomiting, numbness, tingling, focal weakness, headache, or dizziness.

## 2020-10-29 NOTE — ED PROVIDER NOTE - CLINICAL SUMMARY MEDICAL DECISION MAKING FREE TEXT BOX
74 y/o F with PMHx HTN, GERD, HLD, presents to the ED w/ epigastric pain radiating to the back, associated w/ nausea and elevated BP x 3 days. No fever, chills, or cough. Pt's BP is 174/77 in the ED. Heart and lung exams are normal, abdomen soft and nontender. Will check labs, get CTA chest and A/P to r/o dissection given pain in the setting of high BP. Less likely ACS.

## 2020-10-29 NOTE — ED PROVIDER NOTE - NSFOLLOWUPINSTRUCTIONS_ED_ALL_ED_FT
Continue taking your medicine daily as prescribed. Please see your primary care provider in 2-3 days.  Call for appointment.  If you have any problems with followup, please call the ED Referral Coordinator at [phone].  Return to the ER if symptoms worsen or other concerns.    Hypertension    Hypertension, commonly called high blood pressure, is when the force of blood pumping through your arteries is too strong. Hypertension forces your heart to work harder to pump blood. Your arteries may become narrow or stiff. Having untreated or uncontrolled hypertension for a long period of time can cause heart attack, stroke, kidney disease, and other problems. If started on a medication, take exactly as prescribed by your health care professional. Maintain a healthy lifestyle and follow up with your primary care physician.    SEEK IMMEDIATE MEDICAL CARE IF YOU HAVE ANY OF THE FOLLOWING SYMPTOMS: severe headache, confusion, chest pain, abdominal pain, vomiting, or shortness of breath.    Nonspecific Chest Pain  Chest pain can be caused by many different conditions. There is always a chance that your pain could be related to something serious, such as a heart attack or a blood clot in your lungs. Chest pain can also be caused by conditions that are not life-threatening. If you have chest pain, it is very important to follow up with your health care provider.    What are the causes?  Causes of this condition include:    Heartburn.  Pneumonia or bronchitis.  Anxiety or stress.  Inflammation around your heart (pericarditis) or lung (pleuritis or pleurisy).  A blood clot in your lung.  A collapsed lung (pneumothorax). This can develop suddenly on its own (spontaneous pneumothorax) or from trauma to the chest.  Shingles infection (varicella-zoster virus).  Heart attack.  Damage to the bones, muscles, and cartilage that make up your chest wall. This can include:    Bruised bones due to injury.  Strained muscles or cartilage due to frequent or repeated coughing or overwork.  Fracture to one or more ribs.  Sore cartilage due to inflammation (costochondritis).      What increases the risk?  Risk factors for this condition may include:    Activities that increase your risk for trauma or injury to your chest.  Respiratory infections or conditions that cause frequent coughing.  Medical conditions or overeating that can cause heartburn.  Heart disease or family history of heart disease.  Conditions or health behaviors that increase your risk of developing a blood clot.  Having had chicken pox (varicella zoster).    What are the signs or symptoms?  Chest pain can feel like:    Burning or tingling on the surface of your chest or deep in your chest.  Crushing, pressure, aching, or squeezing pain.  Dull or sharp pain that is worse when you move, cough, or take a deep breath.  Pain that is also felt in your back, neck, shoulder, or arm, or pain that spreads to any of these areas.    Your chest pain may come and go, or it may stay constant.    How is this diagnosed?  Lab tests or other studies may be needed to find the cause of your pain. Your health care provider may have you take a test called an ECG (electrocardiogram). An ECG records your heartbeat patterns at the time the test is performed. You may also have other tests, such as:    Transthoracic echocardiogram (TTE). In this test, sound waves are used to create a picture of the heart structures and to look at how blood flows through your heart.  Transesophageal echocardiogram (CORNELIUS). This is a more advanced imaging test that takes images from inside your body. It allows your health care provider to see your heart in finer detail.  Cardiac monitoring. This allows your health care provider to monitor your heart rate and rhythm in real time.  Holter monitor. This is a portable device that records your heartbeat and can help to diagnose abnormal heartbeats. It allows your health care provider to track your heart activity for several days, if needed.  Stress tests. These can be done through exercise or by taking medicine that makes your heart beat more quickly.  Blood tests.  Other imaging tests.    How is this treated?  Treatment depends on what is causing your chest pain. Treatment may include:    Medicines. These may include:    Acid blockers for heartburn.  Anti-inflammatory medicine.  Pain medicine for inflammatory conditions.  Antibiotic medicine, if an infection is present.  Medicines to dissolve blood clots.  Medicines to treat coronary artery disease (CAD).    Supportive care for conditions that do not require medicines. This may include:    Resting.  Applying heat or cold packs to injured areas.  Limiting activities until pain decreases.      Follow these instructions at home:  Medicines     If you were prescribed an antibiotic, take it as told by your health care provider. Do not stop taking the antibiotic even if you start to feel better.  Take over-the-counter and prescription medicines only as told by your health care provider.  Lifestyle     Do not use any products that contain nicotine or tobacco, such as cigarettes and e-cigarettes. If you need help quitting, ask your health care provider.  Do not drink alcohol.  ImageMake lifestyle changes as directed by your health care provider. These may include:    Getting regular exercise. Ask your health care provider to suggest some activities that are safe for you.  Eating a heart-healthy diet. A registered dietitian can help you to learn healthy eating options.  Maintaining a healthy weight.  Managing diabetes, if necessary.  Reducing stress, such as with yoga or relaxation techniques.    General instructions     Avoid any activities that bring on chest pain.  If heartburn is the cause for your chest pain, raise (elevate) the head of your bed about 6 inches (15 cm) by putting blocks under the legs. Sleeping with more pillows does not effectively relieve heartburn because it only changes the position of your head.  Keep all follow-up visits as told by your health care provider. This is important. This includes any further testing if your chest pain does not go away.  Contact a health care provider if:  Your chest pain does not go away.  You have a rash with blisters on your chest.  You have a fever.  You have chills.  Get help right away if:  Your chest pain is worse.  You have a cough that gets worse, or you cough up blood.  You have severe pain in your abdomen.  You have severe weakness.  You faint.  You have sudden, unexplained chest discomfort.  You have sudden, unexplained discomfort in your arms, back, neck, or jaw.  You have shortness of breath at any time.  You suddenly start to sweat, or your skin gets clammy.  You feel nauseous or you vomit.  You suddenly feel light-headed or dizzy.  Your heart begins to beat quickly, or it feels like it is skipping beats.  These symptoms may represent a serious problem that is an emergency. Do not wait to see if the symptoms will go away. Get medical help right away. Call your local emergency services (911 in the [place] Do not drive yourself to the hospital.     This information is not intended to replace advice given to you by your health care provider. Make sure you discuss any questions you have with your health care provider.    Back Pain    Back pain is very common in adults. The cause of back pain is rarely dangerous and the pain often gets better over time. The cause of your back pain may not be known and may include strain of muscles or ligaments, degeneration of the spinal disks, or arthritis. Occasionally the pain may radiate down your leg(s). Over-the-counter medicines to reduce pain and inflammation are often the most helpful. Stretching and remaining active frequently helps the healing process.     SEEK IMMEDIATE MEDICAL CARE IF YOU HAVE ANY OF THE FOLLOWING SYMPTOMS: bowel or bladder control problems, unusual weakness or numbness in your arms or legs, nausea or vomiting, abdominal pain, fever, dizziness/lightheadedness.

## 2020-11-24 ENCOUNTER — APPOINTMENT (OUTPATIENT)
Dept: NEUROLOGY | Facility: CLINIC | Age: 75
End: 2020-11-24
Payer: MEDICARE

## 2020-11-24 VITALS
HEART RATE: 73 BPM | SYSTOLIC BLOOD PRESSURE: 148 MMHG | WEIGHT: 127 LBS | BODY MASS INDEX: 26.66 KG/M2 | HEIGHT: 58 IN | OXYGEN SATURATION: 97 % | TEMPERATURE: 97.9 F | DIASTOLIC BLOOD PRESSURE: 58 MMHG

## 2020-11-24 DIAGNOSIS — Z78.9 OTHER SPECIFIED HEALTH STATUS: ICD-10-CM

## 2020-11-24 DIAGNOSIS — R51.9 HEADACHE, UNSPECIFIED: ICD-10-CM

## 2020-11-24 PROCEDURE — 99214 OFFICE O/P EST MOD 30 MIN: CPT

## 2020-11-24 RX ORDER — GABAPENTIN 100 MG/1
100 CAPSULE ORAL
Qty: 30 | Refills: 1 | Status: ACTIVE | COMMUNITY
Start: 2020-11-24 | End: 1900-01-01

## 2020-11-30 PROBLEM — R51.9 HEADACHE: Status: ACTIVE | Noted: 2020-10-23

## 2020-12-22 ENCOUNTER — APPOINTMENT (OUTPATIENT)
Dept: NEUROLOGY | Facility: CLINIC | Age: 75
End: 2020-12-22

## 2021-01-07 NOTE — ED ADULT TRIAGE NOTE - PAIN: PRESENCE, MLM
Pt brought in by EMS from home. Wife found pt laying on the floor breathing but not responding. Per pt he remembers going to the bathroom then to the kitchen to get ice for a leg cramp.  Pt is alert and oriented upon arrival. Pt remembers felling dizzy prio
complains of pain/discomfort

## 2022-03-30 ENCOUNTER — EMERGENCY (EMERGENCY)
Facility: HOSPITAL | Age: 77
LOS: 1 days | Discharge: HOME | End: 2022-03-30
Attending: EMERGENCY MEDICINE | Admitting: EMERGENCY MEDICINE
Payer: MEDICARE

## 2022-03-30 VITALS
HEART RATE: 87 BPM | RESPIRATION RATE: 18 BRPM | DIASTOLIC BLOOD PRESSURE: 71 MMHG | TEMPERATURE: 98 F | OXYGEN SATURATION: 97 % | SYSTOLIC BLOOD PRESSURE: 137 MMHG

## 2022-03-30 VITALS
OXYGEN SATURATION: 97 % | DIASTOLIC BLOOD PRESSURE: 83 MMHG | RESPIRATION RATE: 16 BRPM | WEIGHT: 119.93 LBS | SYSTOLIC BLOOD PRESSURE: 165 MMHG | HEIGHT: 59 IN | HEART RATE: 103 BPM | TEMPERATURE: 99 F

## 2022-03-30 DIAGNOSIS — R51.9 HEADACHE, UNSPECIFIED: ICD-10-CM

## 2022-03-30 DIAGNOSIS — Z98.890 OTHER SPECIFIED POSTPROCEDURAL STATES: Chronic | ICD-10-CM

## 2022-03-30 DIAGNOSIS — Z20.822 CONTACT WITH AND (SUSPECTED) EXPOSURE TO COVID-19: ICD-10-CM

## 2022-03-30 DIAGNOSIS — I10 ESSENTIAL (PRIMARY) HYPERTENSION: ICD-10-CM

## 2022-03-30 LAB
ALBUMIN SERPL ELPH-MCNC: 4.8 G/DL — SIGNIFICANT CHANGE UP (ref 3.3–5)
ALP SERPL-CCNC: 122 U/L — HIGH (ref 40–120)
ALT FLD-CCNC: 20 U/L — SIGNIFICANT CHANGE UP (ref 10–45)
ANION GAP SERPL CALC-SCNC: 12 MMOL/L — SIGNIFICANT CHANGE UP (ref 5–17)
APTT BLD: 34.9 SEC — SIGNIFICANT CHANGE UP (ref 27.5–35.5)
AST SERPL-CCNC: 25 U/L — SIGNIFICANT CHANGE UP (ref 10–40)
BASOPHILS # BLD AUTO: 0.04 K/UL — SIGNIFICANT CHANGE UP (ref 0–0.2)
BASOPHILS NFR BLD AUTO: 0.6 % — SIGNIFICANT CHANGE UP (ref 0–2)
BILIRUB SERPL-MCNC: 0.6 MG/DL — SIGNIFICANT CHANGE UP (ref 0.2–1.2)
BUN SERPL-MCNC: 11 MG/DL — SIGNIFICANT CHANGE UP (ref 7–23)
CALCIUM SERPL-MCNC: 9.4 MG/DL — SIGNIFICANT CHANGE UP (ref 8.4–10.5)
CHLORIDE SERPL-SCNC: 103 MMOL/L — SIGNIFICANT CHANGE UP (ref 96–108)
CO2 SERPL-SCNC: 25 MMOL/L — SIGNIFICANT CHANGE UP (ref 22–31)
CREAT SERPL-MCNC: 0.69 MG/DL — SIGNIFICANT CHANGE UP (ref 0.5–1.3)
EGFR: 88 ML/MIN/1.73M2 — SIGNIFICANT CHANGE UP
EOSINOPHIL # BLD AUTO: 0.05 K/UL — SIGNIFICANT CHANGE UP (ref 0–0.5)
EOSINOPHIL NFR BLD AUTO: 0.7 % — SIGNIFICANT CHANGE UP (ref 0–6)
GLUCOSE SERPL-MCNC: 120 MG/DL — HIGH (ref 70–99)
HCT VFR BLD CALC: 40.2 % — SIGNIFICANT CHANGE UP (ref 34.5–45)
HGB BLD-MCNC: 13.3 G/DL — SIGNIFICANT CHANGE UP (ref 11.5–15.5)
IMM GRANULOCYTES NFR BLD AUTO: 0.1 % — SIGNIFICANT CHANGE UP (ref 0–1.5)
INR BLD: 1.02 — SIGNIFICANT CHANGE UP (ref 0.88–1.16)
LYMPHOCYTES # BLD AUTO: 1.45 K/UL — SIGNIFICANT CHANGE UP (ref 1–3.3)
LYMPHOCYTES # BLD AUTO: 20.4 % — SIGNIFICANT CHANGE UP (ref 13–44)
MCHC RBC-ENTMCNC: 31.5 PG — SIGNIFICANT CHANGE UP (ref 27–34)
MCHC RBC-ENTMCNC: 33.1 GM/DL — SIGNIFICANT CHANGE UP (ref 32–36)
MCV RBC AUTO: 95.3 FL — SIGNIFICANT CHANGE UP (ref 80–100)
MONOCYTES # BLD AUTO: 0.26 K/UL — SIGNIFICANT CHANGE UP (ref 0–0.9)
MONOCYTES NFR BLD AUTO: 3.7 % — SIGNIFICANT CHANGE UP (ref 2–14)
NEUTROPHILS # BLD AUTO: 5.31 K/UL — SIGNIFICANT CHANGE UP (ref 1.8–7.4)
NEUTROPHILS NFR BLD AUTO: 74.5 % — SIGNIFICANT CHANGE UP (ref 43–77)
NRBC # BLD: 0 /100 WBCS — SIGNIFICANT CHANGE UP (ref 0–0)
PLATELET # BLD AUTO: 224 K/UL — SIGNIFICANT CHANGE UP (ref 150–400)
POTASSIUM SERPL-MCNC: 3.7 MMOL/L — SIGNIFICANT CHANGE UP (ref 3.5–5.3)
POTASSIUM SERPL-SCNC: 3.7 MMOL/L — SIGNIFICANT CHANGE UP (ref 3.5–5.3)
PROT SERPL-MCNC: 8 G/DL — SIGNIFICANT CHANGE UP (ref 6–8.3)
PROTHROM AB SERPL-ACNC: 12.2 SEC — SIGNIFICANT CHANGE UP (ref 10.5–13.4)
RBC # BLD: 4.22 M/UL — SIGNIFICANT CHANGE UP (ref 3.8–5.2)
RBC # FLD: 11.8 % — SIGNIFICANT CHANGE UP (ref 10.3–14.5)
SARS-COV-2 RNA SPEC QL NAA+PROBE: NEGATIVE — SIGNIFICANT CHANGE UP
SODIUM SERPL-SCNC: 140 MMOL/L — SIGNIFICANT CHANGE UP (ref 135–145)
WBC # BLD: 7.12 K/UL — SIGNIFICANT CHANGE UP (ref 3.8–10.5)
WBC # FLD AUTO: 7.12 K/UL — SIGNIFICANT CHANGE UP (ref 3.8–10.5)

## 2022-03-30 PROCEDURE — 85610 PROTHROMBIN TIME: CPT

## 2022-03-30 PROCEDURE — 99285 EMERGENCY DEPT VISIT HI MDM: CPT | Mod: 25

## 2022-03-30 PROCEDURE — 93010 ELECTROCARDIOGRAM REPORT: CPT

## 2022-03-30 PROCEDURE — 85730 THROMBOPLASTIN TIME PARTIAL: CPT

## 2022-03-30 PROCEDURE — 85025 COMPLETE CBC W/AUTO DIFF WBC: CPT

## 2022-03-30 PROCEDURE — 70450 CT HEAD/BRAIN W/O DYE: CPT | Mod: MA

## 2022-03-30 PROCEDURE — 96365 THER/PROPH/DIAG IV INF INIT: CPT

## 2022-03-30 PROCEDURE — 36415 COLL VENOUS BLD VENIPUNCTURE: CPT

## 2022-03-30 PROCEDURE — 93005 ELECTROCARDIOGRAM TRACING: CPT

## 2022-03-30 PROCEDURE — 87635 SARS-COV-2 COVID-19 AMP PRB: CPT

## 2022-03-30 PROCEDURE — 80053 COMPREHEN METABOLIC PANEL: CPT

## 2022-03-30 PROCEDURE — 99285 EMERGENCY DEPT VISIT HI MDM: CPT

## 2022-03-30 PROCEDURE — 96375 TX/PRO/DX INJ NEW DRUG ADDON: CPT

## 2022-03-30 PROCEDURE — 84484 ASSAY OF TROPONIN QUANT: CPT

## 2022-03-30 PROCEDURE — 70450 CT HEAD/BRAIN W/O DYE: CPT | Mod: 26,MA

## 2022-03-30 RX ORDER — SODIUM CHLORIDE 9 MG/ML
500 INJECTION INTRAMUSCULAR; INTRAVENOUS; SUBCUTANEOUS ONCE
Refills: 0 | Status: COMPLETED | OUTPATIENT
Start: 2022-03-30 | End: 2022-03-30

## 2022-03-30 RX ORDER — KETOROLAC TROMETHAMINE 30 MG/ML
15 SYRINGE (ML) INJECTION ONCE
Refills: 0 | Status: DISCONTINUED | OUTPATIENT
Start: 2022-03-30 | End: 2022-03-30

## 2022-03-30 RX ORDER — ACETAMINOPHEN 500 MG
975 TABLET ORAL ONCE
Refills: 0 | Status: COMPLETED | OUTPATIENT
Start: 2022-03-30 | End: 2022-03-30

## 2022-03-30 RX ORDER — MORPHINE SULFATE 50 MG/1
2 CAPSULE, EXTENDED RELEASE ORAL ONCE
Refills: 0 | Status: DISCONTINUED | OUTPATIENT
Start: 2022-03-30 | End: 2022-03-30

## 2022-03-30 RX ORDER — METOCLOPRAMIDE HCL 10 MG
10 TABLET ORAL ONCE
Refills: 0 | Status: COMPLETED | OUTPATIENT
Start: 2022-03-30 | End: 2022-03-30

## 2022-03-30 RX ORDER — METOCLOPRAMIDE HCL 10 MG
10 TABLET ORAL ONCE
Refills: 0 | Status: DISCONTINUED | OUTPATIENT
Start: 2022-03-30 | End: 2022-03-30

## 2022-03-30 RX ADMIN — Medication 975 MILLIGRAM(S): at 21:12

## 2022-03-30 RX ADMIN — SODIUM CHLORIDE 500 MILLILITER(S): 9 INJECTION INTRAMUSCULAR; INTRAVENOUS; SUBCUTANEOUS at 19:30

## 2022-03-30 RX ADMIN — MORPHINE SULFATE 2 MILLIGRAM(S): 50 CAPSULE, EXTENDED RELEASE ORAL at 19:19

## 2022-03-30 RX ADMIN — Medication 10 MILLIGRAM(S): at 19:20

## 2022-03-30 RX ADMIN — MORPHINE SULFATE 2 MILLIGRAM(S): 50 CAPSULE, EXTENDED RELEASE ORAL at 19:30

## 2022-03-30 RX ADMIN — SODIUM CHLORIDE 1000 MILLILITER(S): 9 INJECTION INTRAMUSCULAR; INTRAVENOUS; SUBCUTANEOUS at 20:21

## 2022-03-30 RX ADMIN — Medication 104 MILLIGRAM(S): at 18:50

## 2022-03-30 RX ADMIN — SODIUM CHLORIDE 1000 MILLILITER(S): 9 INJECTION INTRAMUSCULAR; INTRAVENOUS; SUBCUTANEOUS at 18:49

## 2022-03-30 RX ADMIN — Medication 15 MILLIGRAM(S): at 20:21

## 2022-03-30 NOTE — ED ADULT NURSE NOTE - NSIMPLEMENTINTERV_GEN_ALL_ED
Implemented All Fall with Harm Risk Interventions:  [place] to call system. Call bell, personal items and telephone within reach. Instruct patient to call for assistance. Room bathroom lighting operational. Non-slip footwear when patient is off stretcher. Physically safe environment: no spills, clutter or unnecessary equipment. Stretcher in lowest position, wheels locked, appropriate side rails in place. Provide visual cue, wrist band, yellow gown, etc. Monitor gait and stability. Monitor for mental status changes and reorient to person, place, and time. Review medications for side effects contributing to fall risk. Reinforce activity limits and safety measures with patient and family. Provide visual clues: red socks.

## 2022-03-30 NOTE — ED PROVIDER NOTE - PROGRESS NOTE DETAILS
headache nearly resolved states 1/10 compared to 10/10 on arrival, no focal deficits, CTh wnl. CP improving, Discussed with pt results of work up, strict return precautions, and need for follow up.  Pt expressed understanding and agrees with plan.

## 2022-03-30 NOTE — ED ADULT NURSE NOTE - PAIN: NONVERBAL INDICATORS
Therapist from [facility] home care called  bp sitting 149/69 but drops to  84/52 standing    2 episodes of falls  They would like to know if they should hold off on 2 bp meds, or still take in am   Please advise  Chris Maria MA grimace/inability to perform BADLs/inability to perform IADLs/activity pattern change

## 2022-03-30 NOTE — ED ADULT NURSE NOTE - HISTORY OF COVID-19 VACCINATION
[facility] now requests orders and shares plan of care/discharge summaries for some patients through [employer].  Please REPLY TO THIS MESSAGE OR ROUTE BACK TO THE AUTHOR in order to give authorization for orders when needed.  This is considered a verbal order, you will still receive a faxed copy of orders for signature.  Thank you for your assistance in improving collaboration for our patients.    ORDER    SN SOC on or before 4/23    Jyoti, Director of Patient Care Services        Yes

## 2022-03-30 NOTE — ED PROVIDER NOTE - OBJECTIVE STATEMENT
76 yo PMHx of htn with complaints of 3 days of gradual onset headache and increasing BP at home. Denies cp, associated SOB, NVD, lightheaded, diaphoresis, palpitations,  LE pain/swelling, focal weakness/numbness, speech changes. last headache 2 years ago. 76 yo PMHx of htn with complaints of 3 days of gradual onset headache and increasing BP at home. Denies cp, associated SOB, NVD, lightheaded, diaphoresis, palpitations,  LE pain/swelling, focal weakness/numbness, speech changes. last headache 2 years ago. Taking tylenol which helps (last at 130p). No meningismus, fever, chest pain, sob, abd pain, vision changes or any other acute complaints.

## 2022-03-30 NOTE — ED PROVIDER NOTE - CLINICAL SUMMARY MEDICAL DECISION MAKING FREE TEXT BOX
Pt w gradual onset headache, increasing BP, no focal neuro defcitis, current neck pain, f/c, meningismus, took tylenol pta.  ddx: ICH, Pt w gradual onset headache, increasing BP, no focal neuro defcitis, current neck pain, f/c, meningismus, took tylenol pta.  ddx: I[facility], migraine, dehydration, other  -labs, CT, symptom control, reassess.

## 2022-03-30 NOTE — ED PROVIDER NOTE - NSFOLLOWUPCLINICS_GEN_ALL_ED_FT
[facility]  Family Medicine  [address]  [place], [place] [address]  Phone: [phone]  Fax:     
Admitted

## 2022-03-30 NOTE — ED ADULT NURSE NOTE - CHIEF COMPLAINT QUOTE
Patient tested positive for Covid for pre-op labs.  Please call patient to let her know her surgery needs to be postponed.    pt complaining of head ache/ heaviness x 3 days checking her BP at home noting it to be high denies blurry vision CP SOB

## 2022-03-30 NOTE — ED ADULT TRIAGE NOTE - CHIEF COMPLAINT QUOTE
pt complaining of head ache/ heaviness x 3 days checking her BP at home noting it to be high denies blurry vision CP SOB

## 2022-03-30 NOTE — ED PROVIDER NOTE - PATIENT PORTAL LINK FT
You can access the FollowMyHealth Patient Portal offered by [facility] by registering at the following website: [email or web address]. By joining [employer]’s FollowMyHealth portal, you will also be able to view your health information using other applications (apps) compatible with our system.

## 2022-03-30 NOTE — ED ADULT NURSE NOTE - OBJECTIVE STATEMENT
Pt presented to ED with c/o of rt sided headache and inability to control her blood pressure despite taking her meds. AOX4. Patient denies chest pain, discomfort, shortness of breath, difficulty breathing and any form of distress not noted. Patient oriented to ED area. All needs attended. Rounding in progress. Fall risk precautions maintained.

## 2022-04-05 ENCOUNTER — EMERGENCY (EMERGENCY)
Facility: HOSPITAL | Age: 77
LOS: 1 days | Discharge: HOME | End: 2022-04-05
Attending: EMERGENCY MEDICINE | Admitting: EMERGENCY MEDICINE
Payer: MEDICARE

## 2022-04-05 VITALS
RESPIRATION RATE: 18 BRPM | HEIGHT: 59 IN | SYSTOLIC BLOOD PRESSURE: 177 MMHG | TEMPERATURE: 99 F | OXYGEN SATURATION: 100 % | DIASTOLIC BLOOD PRESSURE: 78 MMHG | WEIGHT: 119.93 LBS | HEART RATE: 104 BPM

## 2022-04-05 DIAGNOSIS — Z98.890 OTHER SPECIFIED POSTPROCEDURAL STATES: Chronic | ICD-10-CM

## 2022-04-05 LAB
ALBUMIN SERPL ELPH-MCNC: 4.5 G/DL — SIGNIFICANT CHANGE UP (ref 3.3–5)
ALP SERPL-CCNC: 109 U/L — SIGNIFICANT CHANGE UP (ref 40–120)
ALT FLD-CCNC: 20 U/L — SIGNIFICANT CHANGE UP (ref 10–45)
ANION GAP SERPL CALC-SCNC: 13 MMOL/L — SIGNIFICANT CHANGE UP (ref 5–17)
APTT BLD: 30.1 SEC — SIGNIFICANT CHANGE UP (ref 27.5–35.5)
AST SERPL-CCNC: 25 U/L — SIGNIFICANT CHANGE UP (ref 10–40)
BASOPHILS # BLD AUTO: 0.04 K/UL — SIGNIFICANT CHANGE UP (ref 0–0.2)
BASOPHILS NFR BLD AUTO: 0.6 % — SIGNIFICANT CHANGE UP (ref 0–2)
BILIRUB SERPL-MCNC: 0.5 MG/DL — SIGNIFICANT CHANGE UP (ref 0.2–1.2)
BUN SERPL-MCNC: 13 MG/DL — SIGNIFICANT CHANGE UP (ref 7–23)
CALCIUM SERPL-MCNC: 9.2 MG/DL — SIGNIFICANT CHANGE UP (ref 8.4–10.5)
CHLORIDE SERPL-SCNC: 102 MMOL/L — SIGNIFICANT CHANGE UP (ref 96–108)
CK MB CFR SERPL CALC: 4.2 NG/ML — SIGNIFICANT CHANGE UP (ref 0–6.7)
CK SERPL-CCNC: 158 U/L — SIGNIFICANT CHANGE UP (ref 25–170)
CO2 SERPL-SCNC: 24 MMOL/L — SIGNIFICANT CHANGE UP (ref 22–31)
CREAT SERPL-MCNC: 0.79 MG/DL — SIGNIFICANT CHANGE UP (ref 0.5–1.3)
EGFR: 76 ML/MIN/1.73M2 — SIGNIFICANT CHANGE UP
EOSINOPHIL # BLD AUTO: 0.09 K/UL — SIGNIFICANT CHANGE UP (ref 0–0.5)
EOSINOPHIL NFR BLD AUTO: 1.3 % — SIGNIFICANT CHANGE UP (ref 0–6)
GLUCOSE SERPL-MCNC: 113 MG/DL — HIGH (ref 70–99)
HCT VFR BLD CALC: 37.2 % — SIGNIFICANT CHANGE UP (ref 34.5–45)
HGB BLD-MCNC: 12.7 G/DL — SIGNIFICANT CHANGE UP (ref 11.5–15.5)
IMM GRANULOCYTES NFR BLD AUTO: 0.3 % — SIGNIFICANT CHANGE UP (ref 0–1.5)
INR BLD: 1 — SIGNIFICANT CHANGE UP (ref 0.88–1.16)
LACTATE SERPL-SCNC: 1.1 MMOL/L — SIGNIFICANT CHANGE UP (ref 0.5–2)
LYMPHOCYTES # BLD AUTO: 2.11 K/UL — SIGNIFICANT CHANGE UP (ref 1–3.3)
LYMPHOCYTES # BLD AUTO: 30.2 % — SIGNIFICANT CHANGE UP (ref 13–44)
MAGNESIUM SERPL-MCNC: 2 MG/DL — SIGNIFICANT CHANGE UP (ref 1.6–2.6)
MCHC RBC-ENTMCNC: 32.2 PG — SIGNIFICANT CHANGE UP (ref 27–34)
MCHC RBC-ENTMCNC: 34.1 GM/DL — SIGNIFICANT CHANGE UP (ref 32–36)
MCV RBC AUTO: 94.4 FL — SIGNIFICANT CHANGE UP (ref 80–100)
MONOCYTES # BLD AUTO: 0.42 K/UL — SIGNIFICANT CHANGE UP (ref 0–0.9)
MONOCYTES NFR BLD AUTO: 6 % — SIGNIFICANT CHANGE UP (ref 2–14)
NEUTROPHILS # BLD AUTO: 4.3 K/UL — SIGNIFICANT CHANGE UP (ref 1.8–7.4)
NEUTROPHILS NFR BLD AUTO: 61.6 % — SIGNIFICANT CHANGE UP (ref 43–77)
NRBC # BLD: 0 /100 WBCS — SIGNIFICANT CHANGE UP (ref 0–0)
NT-PROBNP SERPL-SCNC: 119 PG/ML — SIGNIFICANT CHANGE UP (ref 0–300)
PLATELET # BLD AUTO: 234 K/UL — SIGNIFICANT CHANGE UP (ref 150–400)
POTASSIUM SERPL-MCNC: 3.7 MMOL/L — SIGNIFICANT CHANGE UP (ref 3.5–5.3)
POTASSIUM SERPL-SCNC: 3.7 MMOL/L — SIGNIFICANT CHANGE UP (ref 3.5–5.3)
PROT SERPL-MCNC: 7.5 G/DL — SIGNIFICANT CHANGE UP (ref 6–8.3)
PROTHROM AB SERPL-ACNC: 11.9 SEC — SIGNIFICANT CHANGE UP (ref 10.5–13.4)
RBC # BLD: 3.94 M/UL — SIGNIFICANT CHANGE UP (ref 3.8–5.2)
RBC # FLD: 11.7 % — SIGNIFICANT CHANGE UP (ref 10.3–14.5)
SARS-COV-2 RNA SPEC QL NAA+PROBE: NEGATIVE — SIGNIFICANT CHANGE UP
SODIUM SERPL-SCNC: 139 MMOL/L — SIGNIFICANT CHANGE UP (ref 135–145)
TROPONIN T SERPL-MCNC: 0.01 NG/ML — SIGNIFICANT CHANGE UP (ref 0–0.01)
TROPONIN T SERPL-MCNC: 0.01 NG/ML — SIGNIFICANT CHANGE UP (ref 0–0.01)
WBC # BLD: 6.98 K/UL — SIGNIFICANT CHANGE UP (ref 3.8–10.5)
WBC # FLD AUTO: 6.98 K/UL — SIGNIFICANT CHANGE UP (ref 3.8–10.5)

## 2022-04-05 PROCEDURE — 93010 ELECTROCARDIOGRAM REPORT: CPT

## 2022-04-05 PROCEDURE — 71045 X-RAY EXAM CHEST 1 VIEW: CPT | Mod: 26

## 2022-04-05 PROCEDURE — 99285 EMERGENCY DEPT VISIT HI MDM: CPT | Mod: CS

## 2022-04-05 RX ORDER — KETOROLAC TROMETHAMINE 30 MG/ML
15 SYRINGE (ML) INJECTION ONCE
Refills: 0 | Status: DISCONTINUED | OUTPATIENT
Start: 2022-04-05 | End: 2022-04-05

## 2022-04-05 RX ORDER — ACETAMINOPHEN 500 MG
650 TABLET ORAL ONCE
Refills: 0 | Status: COMPLETED | OUTPATIENT
Start: 2022-04-05 | End: 2022-04-05

## 2022-04-05 RX ORDER — FAMOTIDINE 10 MG/ML
20 INJECTION INTRAVENOUS ONCE
Refills: 0 | Status: COMPLETED | OUTPATIENT
Start: 2022-04-05 | End: 2022-04-05

## 2022-04-05 RX ADMIN — FAMOTIDINE 20 MILLIGRAM(S): 10 INJECTION INTRAVENOUS at 21:26

## 2022-04-05 RX ADMIN — Medication 30 MILLILITER(S): at 21:25

## 2022-04-05 RX ADMIN — Medication 650 MILLIGRAM(S): at 21:26

## 2022-04-05 RX ADMIN — Medication 15 MILLIGRAM(S): at 23:22

## 2022-04-05 NOTE — ED ADULT NURSE NOTE - OBJECTIVE STATEMENT
PAtient presents to ED c/o burning below ribs, up entire chest, to left side of neck and down the left arm.  Patient states the pain began this morning, "I can't take it any more."  Took 2 Tylenol with minimal relief.  STAT EKG completed at time of triage.  Denies sob/dizziness/vomiting/n/d/f.

## 2022-04-05 NOTE — ED PROVIDER NOTE - PROGRESS NOTE DETAILS
pt reports persistent luq pain radiating up chest to neck. will obtain cta c/a/p to r/o dissection. pt reports resolved sx but no one available to pick her up until the AM.

## 2022-04-05 NOTE — ED PROVIDER NOTE - NSFOLLOWUPINSTRUCTIONS_ED_ALL_ED_FT
REST AND REMAIN HYDRATED. AVOID SPICY FOODS -- RECOMMEND BLAND FOODS. TRIAL PEPCID OVER-THE-COUNTER.    PLEASE FOLLOW-UP WITH GASTROENTEROLOGY AND CARDIOLOGY IN 1-2 DAYS.    PLEASE RETURN TO THE EMERGENCY DEPARTMENT IF FEVER, CHEST PAIN, SHORTNESS OF BREATH, ABDOMINAL PAIN, VOMITING, OTHER CONCERNING SYMPTOMS.    PLEASE CONTACT ELIZABET CURTIS ([facility] EMERGENCY DEPARTMENT CLINICAL REFERRAL COORDINATOR) TO ASSIST IN SCHEDULING YOUR FOLLOW-UP APPOINTMENT.    Monday - Friday 11am-7pm  [phone]  [email or web address]                            CHEST PAIN - Discharge Care           Chest Pain    WHAT YOU NEED TO KNOW:    Chest pain can be caused by a range of conditions, from not serious to life-threatening. Chest pain can be a symptom of a digestive problem, such as acid reflux or a stomach ulcer. An anxiety attack or a strong emotion, such as anger, can also cause chest pain. Infection, inflammation, or a fracture in the bones or cartilage in your chest can cause pain or discomfort. Sometimes chest pain or pressure is caused by poor blood flow to your heart (angina). Chest pain may also be caused by life-threatening conditions such as a heart attack or blood clot in your lungs.    DISCHARGE INSTRUCTIONS:    Call your local emergency number (911 in the [place]) or have someone call if:   •You have any of the following signs of a heart attack: ?Squeezing, pressure, or pain in your chest      ?You may also have any of the following: ?Discomfort or pain in your back, neck, jaw, stomach, or arm      ?Shortness of breath      ?Nausea or vomiting      ?Lightheadedness or a sudden cold sweat            Seek care immediately if:   •You have chest discomfort that gets worse, even with medicine.      •You cough or vomit blood.      •Your bowel movements are black or bloody.      •You cannot stop vomiting, or it hurts to swallow.      Call your doctor if:   •You have questions or concerns about your condition or care.          Medicines:   •Medicines may be given to treat the cause of your chest pain. Examples include pain medicine, anxiety medicine, or medicines to increase blood flow to your heart.      •Do not take certain medicines without asking your healthcare provider first. These include NSAIDs, herbal or vitamin supplements, and hormones, such as estrogen or progestin.      •Take your medicine as directed. Contact your healthcare provider if you think your medicine is not helping or if you have side effects. Tell him or her if you are allergic to any medicine. Keep a list of the medicines, vitamins, and herbs you take. Include the amounts, and when and why you take them. Bring the list or the pill bottles to follow-up visits. Carry your medicine list with you in case of an emergency.      Healthy living tips: If the cause of your chest pain is known, your healthcare provider will give you specific guidelines to follow. The following are general healthy guidelines:  •Do not smoke. Nicotine and other chemicals in cigarettes and cigars can cause lung and heart damage. Ask your healthcare provider for information if you currently smoke and need help to quit. E-cigarettes or smokeless tobacco still contain nicotine. Talk to your healthcare provider before you use these products.      •Choose a variety of healthy foods as often as possible. Include fresh, frozen, or canned fruits and vegetables. Also include low-fat dairy products, fish, chicken (without skin), and lean meats. Your healthcare provider or a dietitian can help you create meal plans. You may need to avoid certain foods or drinks if your pain is caused by a digestion problem.  Healthy Foods           •Lower your sodium (salt) intake. Limit foods that are high in sodium, such as canned foods, salty snacks, and cold cuts. If you add salt when you cook food, do not add more at the table. Choose low-sodium canned foods as much as possible.             •Drink plenty of water every day. Water helps your body to control temperature and blood pressure. Ask your healthcare provider how much water you should drink every day.      •Ask about activity. Your healthcare provider will tell you which activities to limit or avoid. Ask when you can drive, return to work, and have sex. Ask about the best exercise plan for you.      •Maintain a healthy weight. Ask your healthcare provider what a healthy weight is for you. Ask him or her to help you create a safe weight loss plan if you are overweight.      •Ask about vaccines you may need. Your healthcare provider can tell you which vaccines you need, and when to get them. The following vaccines help prevent diseases that can become serious for a person with a heart condition:?The influenza (flu) vaccine is given each year. Get a flu vaccine as soon as recommended, usually in September or October.      ?The pneumonia vaccine is usually given every 5 years. Your healthcare provider may recommend the pneumonia vaccine if you are 65 or older.      ?COVID-19 vaccines are given to adults as a shot in 1 or 2 doses.   Vaccination is recommended for all adults. A booster (additional) dose is also recommended to help your immune system continue to protect against severe COVID-19. The booster can be a different brand of the COVID-19 vaccine than you originally received. The timing for the booster depends on the type of vaccine you received:?1-dose vaccine: The booster is given at least 2 months after you received the vaccine.      ?2-dose vaccine: The booster is given at least 5 to 6 months after the second dose.         Prevent Heart Disease          Follow up with your doctor within 72 hours, or as directed: You may need to return for more tests to find the cause of your chest pain. You may be referred to a specialist, such as a cardiologist or gastroenterologist. Write down your questions so you remember to ask them during your visits.       © Copyright [employer] 2022           back to top                          © Copyright [employer] 2022

## 2022-04-05 NOTE — ED ADULT TRIAGE NOTE - INTERNATIONAL TRAVEL
[FreeTextEntry1] : Echocardiogram 3/12/21: \par Global systolic function: LVEF 55-60%\par Moderate mitral regurgitation \par Moderate aortic regurgitation \par \par \par Carotid doppler 5/12/21\par carotid arterial atherosclerotic disease without hemodynamically significant stenosis <50%\par  
No

## 2022-04-05 NOTE — ED PROVIDER NOTE - PATIENT PORTAL LINK FT
You can access the FollowMyHealth Patient Portal offered by [facility] by registering at the following website: [email or web address]. By joining [employer]’s FollowMyHealth portal, you will also be able to view your health information using other applications (apps) compatible with our system.

## 2022-04-05 NOTE — ED PROVIDER NOTE - PHYSICAL EXAMINATION
CONST: nontoxic NAD speaking in full sentences  HEAD: atraumatic  EYES: conjunctivae clear  ENT: mmm  NECK: supple/FROM, nttp, no jvd  CARD: rrr no murmurs  CHEST: ctab no r/r/w, no stridor/retractions/tripoding  ABD: soft, nd, nttp, no rebound/guarding  EXT: FROM, symmetric distal pulses intact  SKIN: warm, dry, no rash, no pedal edema/ttp/rash, cap refill <2sec  NEURO: a+ox3, 5/5 strength x4, gross sensation intact x4, baseline gait

## 2022-04-05 NOTE — ED PROVIDER NOTE - NS_EDPROVIDERDISPOUSERTYPE_ED_A_ED
Addended by: SOL MEADOWS on: 11/18/2019 01:38 PM     Modules accepted: Orders     Attending Attestation (For Attendings USE Only)...

## 2022-04-05 NOTE — ED ADULT TRIAGE NOTE - NS ED NURSE DIRECT TO ROOM YN
Patient yesterday was in the shower and immediately felt lower back pain. Patient took only flexeril 3 hours ago. 10mg.  
No

## 2022-04-05 NOTE — ED ADULT TRIAGE NOTE - AS TEMP SITE
Boby Mendoza is here today for Pre-Op Exam (Right knee  3/3/22 )    Concerns/symptoms: pre op   Medications: medications verified and updated  Refills needed today? Yes, medications pended     Tobacco history: verified  Advanced Directives: Yes on file.  BP greater than 140/90? No    Patient would like communication of their results via:      Cell Phone:   Telephone Information:   Mobile [phone]     Okay to leave a message containing results? Yes  Preferred language:  English.    Health Maintenance Due   Topic Date Due   • DTaP/Tdap/Td Vaccine (1 - Tdap) 10/18/2003   • Shingles Vaccine (2 of 3) 04/06/2009   • Influenza Vaccine (1) 09/01/2021   • Medicare Wellness Visit  09/09/2021      Patient is due for topics as listed above but is not proceeding with  at this time. .    Medicare HRA:              PHQ 2:  Date Adult PHQ 2 Score Adult PHQ 2 Interpretation   2/25/2022 0 No further screening needed       PHQ 9:      oral

## 2022-04-05 NOTE — ED PROVIDER NOTE - OBJECTIVE STATEMENT
77F nonsmoker, htn, hld, completed covid19 vaccination, c/o insidious waxing/waning luq burning sensation radiating up L chest to L neck while sitting at rest since 12:30p today. similar prior episode found to have "moderately elevated calcium score at 198 agatston units/85th percentile and nonobstructive cad" on ccta and EF 55-60% by TTE (2/2018). no fever/chills, no ha/dizziness, no uri/cough, no sob, no n/v, no diarrhea, no hematochezia/melena, no pedal edema/pain/rash, no prior covid, no trauma, no etoh-dpt/ivdu, no phx acs/mi vs dvt/pe, no recent endoscopy/colonoscopy, no recent cardiac risk stratification.    cards: leann

## 2022-04-05 NOTE — ED PROVIDER NOTE - CARE PROVIDER_API CALL
Trenton Pablo)  Medicine  [address], [address]  [place] [address]  Phone: [phone]  Fax: [phone]  Follow Up Time: Urgent

## 2022-04-06 VITALS
TEMPERATURE: 98 F | SYSTOLIC BLOOD PRESSURE: 135 MMHG | RESPIRATION RATE: 18 BRPM | HEART RATE: 76 BPM | DIASTOLIC BLOOD PRESSURE: 70 MMHG | OXYGEN SATURATION: 100 %

## 2022-04-06 PROCEDURE — 71275 CT ANGIOGRAPHY CHEST: CPT | Mod: 26,MG

## 2022-04-06 PROCEDURE — 83880 ASSAY OF NATRIURETIC PEPTIDE: CPT

## 2022-04-06 PROCEDURE — 71045 X-RAY EXAM CHEST 1 VIEW: CPT

## 2022-04-06 PROCEDURE — 84484 ASSAY OF TROPONIN QUANT: CPT

## 2022-04-06 PROCEDURE — 93005 ELECTROCARDIOGRAM TRACING: CPT

## 2022-04-06 PROCEDURE — 83735 ASSAY OF MAGNESIUM: CPT

## 2022-04-06 PROCEDURE — 83605 ASSAY OF LACTIC ACID: CPT

## 2022-04-06 PROCEDURE — 85730 THROMBOPLASTIN TIME PARTIAL: CPT

## 2022-04-06 PROCEDURE — G1004: CPT

## 2022-04-06 PROCEDURE — 74174 CTA ABD&PLVS W/CONTRAST: CPT | Mod: MG

## 2022-04-06 PROCEDURE — 99285 EMERGENCY DEPT VISIT HI MDM: CPT | Mod: 25

## 2022-04-06 PROCEDURE — 36415 COLL VENOUS BLD VENIPUNCTURE: CPT

## 2022-04-06 PROCEDURE — 82553 CREATINE MB FRACTION: CPT

## 2022-04-06 PROCEDURE — 82550 ASSAY OF CK (CPK): CPT

## 2022-04-06 PROCEDURE — 85610 PROTHROMBIN TIME: CPT

## 2022-04-06 PROCEDURE — 80053 COMPREHEN METABOLIC PANEL: CPT

## 2022-04-06 PROCEDURE — 85025 COMPLETE CBC W/AUTO DIFF WBC: CPT

## 2022-04-06 PROCEDURE — 74174 CTA ABD&PLVS W/CONTRAST: CPT | Mod: 26,MG

## 2022-04-06 PROCEDURE — 87635 SARS-COV-2 COVID-19 AMP PRB: CPT

## 2022-04-06 PROCEDURE — 71275 CT ANGIOGRAPHY CHEST: CPT | Mod: MG

## 2022-04-06 RX ORDER — MORPHINE SULFATE 50 MG/1
4 CAPSULE, EXTENDED RELEASE ORAL ONCE
Refills: 0 | Status: DISCONTINUED | OUTPATIENT
Start: 2022-04-06 | End: 2022-04-06

## 2022-04-06 RX ADMIN — MORPHINE SULFATE 4 MILLIGRAM(S): 50 CAPSULE, EXTENDED RELEASE ORAL at 00:48

## 2022-04-07 DIAGNOSIS — R10.12 LEFT UPPER QUADRANT PAIN: ICD-10-CM

## 2022-04-07 DIAGNOSIS — R07.89 OTHER CHEST PAIN: ICD-10-CM

## 2022-04-07 DIAGNOSIS — K21.9 GASTRO-ESOPHAGEAL REFLUX DISEASE WITHOUT ESOPHAGITIS: ICD-10-CM

## 2022-04-07 DIAGNOSIS — I10 ESSENTIAL (PRIMARY) HYPERTENSION: ICD-10-CM

## 2022-04-07 DIAGNOSIS — Z20.822 CONTACT WITH AND (SUSPECTED) EXPOSURE TO COVID-19: ICD-10-CM

## 2022-04-07 DIAGNOSIS — E78.5 HYPERLIPIDEMIA, UNSPECIFIED: ICD-10-CM

## 2022-10-19 ENCOUNTER — EMERGENCY (EMERGENCY)
Facility: HOSPITAL | Age: 77
LOS: 1 days | Discharge: HOME | End: 2022-10-19
Attending: STUDENT IN AN ORGANIZED HEALTH CARE EDUCATION/TRAINING PROGRAM | Admitting: STUDENT IN AN ORGANIZED HEALTH CARE EDUCATION/TRAINING PROGRAM
Payer: MEDICARE

## 2022-10-19 VITALS
WEIGHT: 119.93 LBS | OXYGEN SATURATION: 100 % | DIASTOLIC BLOOD PRESSURE: 73 MMHG | HEIGHT: 59 IN | TEMPERATURE: 99 F | HEART RATE: 105 BPM | SYSTOLIC BLOOD PRESSURE: 153 MMHG | RESPIRATION RATE: 16 BRPM

## 2022-10-19 VITALS
RESPIRATION RATE: 16 BRPM | SYSTOLIC BLOOD PRESSURE: 127 MMHG | HEART RATE: 84 BPM | OXYGEN SATURATION: 100 % | DIASTOLIC BLOOD PRESSURE: 70 MMHG | TEMPERATURE: 99 F

## 2022-10-19 DIAGNOSIS — Z98.890 OTHER SPECIFIED POSTPROCEDURAL STATES: Chronic | ICD-10-CM

## 2022-10-19 LAB
ALBUMIN SERPL ELPH-MCNC: 4.8 G/DL — SIGNIFICANT CHANGE UP (ref 3.3–5)
ALP SERPL-CCNC: 141 U/L — HIGH (ref 40–120)
ALT FLD-CCNC: 18 U/L — SIGNIFICANT CHANGE UP (ref 10–45)
ANION GAP SERPL CALC-SCNC: 10 MMOL/L — SIGNIFICANT CHANGE UP (ref 5–17)
APTT BLD: 29.1 SEC — SIGNIFICANT CHANGE UP (ref 27.5–35.5)
AST SERPL-CCNC: 23 U/L — SIGNIFICANT CHANGE UP (ref 10–40)
BASOPHILS # BLD AUTO: 0.02 K/UL — SIGNIFICANT CHANGE UP (ref 0–0.2)
BASOPHILS NFR BLD AUTO: 0.2 % — SIGNIFICANT CHANGE UP (ref 0–2)
BILIRUB SERPL-MCNC: 0.8 MG/DL — SIGNIFICANT CHANGE UP (ref 0.2–1.2)
BUN SERPL-MCNC: 12 MG/DL — SIGNIFICANT CHANGE UP (ref 7–23)
CALCIUM SERPL-MCNC: 9.5 MG/DL — SIGNIFICANT CHANGE UP (ref 8.4–10.5)
CHLORIDE SERPL-SCNC: 101 MMOL/L — SIGNIFICANT CHANGE UP (ref 96–108)
CO2 SERPL-SCNC: 29 MMOL/L — SIGNIFICANT CHANGE UP (ref 22–31)
CREAT SERPL-MCNC: 0.68 MG/DL — SIGNIFICANT CHANGE UP (ref 0.5–1.3)
EGFR: 89 ML/MIN/1.73M2 — SIGNIFICANT CHANGE UP
EOSINOPHIL # BLD AUTO: 0 K/UL — SIGNIFICANT CHANGE UP (ref 0–0.5)
EOSINOPHIL NFR BLD AUTO: 0 % — SIGNIFICANT CHANGE UP (ref 0–6)
GLUCOSE SERPL-MCNC: 123 MG/DL — HIGH (ref 70–99)
HCT VFR BLD CALC: 38.9 % — SIGNIFICANT CHANGE UP (ref 34.5–45)
HGB BLD-MCNC: 13 G/DL — SIGNIFICANT CHANGE UP (ref 11.5–15.5)
IMM GRANULOCYTES NFR BLD AUTO: 0.2 % — SIGNIFICANT CHANGE UP (ref 0–0.9)
INR BLD: 1 — SIGNIFICANT CHANGE UP (ref 0.88–1.16)
LYMPHOCYTES # BLD AUTO: 1.19 K/UL — SIGNIFICANT CHANGE UP (ref 1–3.3)
LYMPHOCYTES # BLD AUTO: 14.5 % — SIGNIFICANT CHANGE UP (ref 13–44)
MCHC RBC-ENTMCNC: 31.5 PG — SIGNIFICANT CHANGE UP (ref 27–34)
MCHC RBC-ENTMCNC: 33.4 GM/DL — SIGNIFICANT CHANGE UP (ref 32–36)
MCV RBC AUTO: 94.2 FL — SIGNIFICANT CHANGE UP (ref 80–100)
MONOCYTES # BLD AUTO: 0.26 K/UL — SIGNIFICANT CHANGE UP (ref 0–0.9)
MONOCYTES NFR BLD AUTO: 3.2 % — SIGNIFICANT CHANGE UP (ref 2–14)
NEUTROPHILS # BLD AUTO: 6.73 K/UL — SIGNIFICANT CHANGE UP (ref 1.8–7.4)
NEUTROPHILS NFR BLD AUTO: 81.9 % — HIGH (ref 43–77)
NRBC # BLD: 0 /100 WBCS — SIGNIFICANT CHANGE UP (ref 0–0)
PLATELET # BLD AUTO: 246 K/UL — SIGNIFICANT CHANGE UP (ref 150–400)
POTASSIUM SERPL-MCNC: 3.6 MMOL/L — SIGNIFICANT CHANGE UP (ref 3.5–5.3)
POTASSIUM SERPL-SCNC: 3.6 MMOL/L — SIGNIFICANT CHANGE UP (ref 3.5–5.3)
PROT SERPL-MCNC: 7.9 G/DL — SIGNIFICANT CHANGE UP (ref 6–8.3)
PROTHROM AB SERPL-ACNC: 11.9 SEC — SIGNIFICANT CHANGE UP (ref 10.5–13.4)
RBC # BLD: 4.13 M/UL — SIGNIFICANT CHANGE UP (ref 3.8–5.2)
RBC # FLD: 11.8 % — SIGNIFICANT CHANGE UP (ref 10.3–14.5)
SARS-COV-2 RNA SPEC QL NAA+PROBE: NEGATIVE — SIGNIFICANT CHANGE UP
SODIUM SERPL-SCNC: 140 MMOL/L — SIGNIFICANT CHANGE UP (ref 135–145)
TROPONIN T SERPL-MCNC: <0.01 NG/ML — SIGNIFICANT CHANGE UP (ref 0–0.01)
WBC # BLD: 8.22 K/UL — SIGNIFICANT CHANGE UP (ref 3.8–10.5)
WBC # FLD AUTO: 8.22 K/UL — SIGNIFICANT CHANGE UP (ref 3.8–10.5)

## 2022-10-19 PROCEDURE — 99285 EMERGENCY DEPT VISIT HI MDM: CPT

## 2022-10-19 PROCEDURE — 96374 THER/PROPH/DIAG INJ IV PUSH: CPT

## 2022-10-19 PROCEDURE — 85610 PROTHROMBIN TIME: CPT

## 2022-10-19 PROCEDURE — 87635 SARS-COV-2 COVID-19 AMP PRB: CPT

## 2022-10-19 PROCEDURE — 36415 COLL VENOUS BLD VENIPUNCTURE: CPT

## 2022-10-19 PROCEDURE — 71045 X-RAY EXAM CHEST 1 VIEW: CPT

## 2022-10-19 PROCEDURE — 80053 COMPREHEN METABOLIC PANEL: CPT

## 2022-10-19 PROCEDURE — 84484 ASSAY OF TROPONIN QUANT: CPT

## 2022-10-19 PROCEDURE — 99284 EMERGENCY DEPT VISIT MOD MDM: CPT | Mod: 25

## 2022-10-19 PROCEDURE — 85730 THROMBOPLASTIN TIME PARTIAL: CPT

## 2022-10-19 PROCEDURE — 83690 ASSAY OF LIPASE: CPT

## 2022-10-19 PROCEDURE — 71045 X-RAY EXAM CHEST 1 VIEW: CPT | Mod: 26

## 2022-10-19 PROCEDURE — 85025 COMPLETE CBC W/AUTO DIFF WBC: CPT

## 2022-10-19 PROCEDURE — 93010 ELECTROCARDIOGRAM REPORT: CPT

## 2022-10-19 RX ORDER — ACETAMINOPHEN 500 MG
650 TABLET ORAL ONCE
Refills: 0 | Status: COMPLETED | OUTPATIENT
Start: 2022-10-19 | End: 2022-10-19

## 2022-10-19 RX ORDER — FAMOTIDINE 10 MG/ML
20 INJECTION INTRAVENOUS ONCE
Refills: 0 | Status: COMPLETED | OUTPATIENT
Start: 2022-10-19 | End: 2022-10-19

## 2022-10-19 RX ADMIN — Medication 30 MILLILITER(S): at 12:48

## 2022-10-19 RX ADMIN — Medication 650 MILLIGRAM(S): at 12:48

## 2022-10-19 RX ADMIN — FAMOTIDINE 20 MILLIGRAM(S): 10 INJECTION INTRAVENOUS at 12:48

## 2022-10-19 NOTE — ED PROVIDER NOTE - NS ED ROS FT
CONSTITUTIONAL: No fever, no chills, no fatigue  EYES: No eye redness, no visual changes  ENT: No ear pain, no sore throat  CARDIOVASCULAR: + chest pain, no palpitations  RESPIRATORY: No cough, no SOB  GI: No abdominal pain, no nausea, no vomiting, no constipation, no diarrhea  GENITOURINARY: No dysuria, no frequency, no hematuria  MUSCULOSKELETAL: No backpain, no joint pain, no myalgias  SKIN: No rash, no peripheral edema  NEURO: No headache, no confusion    ALL OTHER SYSTEMS NEGATIVE.

## 2022-10-19 NOTE — ED PROVIDER NOTE - CLINICAL SUMMARY MEDICAL DECISION MAKING FREE TEXT BOX
Symptoms most indicative of patients' chronic GERD/Gastritis. Abdomen is nontender. Low suspicion of peptic ulcer disease or significant intraabdominal pathology. Pain is atypical for ACS. Nonexertional. Lipase is normal, Troponin negative and EKG unremarkable. Will give GI cocktail and reassess. Symptoms most indicative of patients' chronic GERD/Gastritis. Abdomen is nontender. Low suspicion of peptic ulcer disease or significant intraabdominal pathology. Pain is atypical for ACS. Nonexertional. Lipase is normal, Troponin negative and EKG unremarkable. Will give GI cocktail and reassess.    On reassessment, Pt feels improved after treatment.    CXR negative for pneumothorax or pneumonia or other opacity or acute abnormality. ACS eval with negative troponin, EKG, CXR.  HEART score considered. Discussed with the patient the residual risk of ACS and MACE in the context of their negative troponin testing, EKG findings, risk factors, age, and story. They accept this residual risk and wish to follow up with cardiology and PMD as outpatient. All findings discussed. Ok to discharge with home care instructions, return precautions.    Most likely gerd - pt has gi appt this friday w dr fowler Symptoms most indicative of patients' chronic GERD/Gastritis. Abdomen is nontender. Low suspicion of peptic ulcer disease or significant intraabdominal pathology. Pain is atypical for ACS, nonexertional. Lipase is normal, Troponin negative and EKG unremarkable. Will give GI cocktail and reassess.    On reassessment, Pt feels improved after treatment.    CXR negative for pneumothorax or pneumonia or other opacity or acute abnormality. ACS eval with negative troponin, EKG, CXR.  HEART score considered. Discussed with the patient the residual risk of ACS and MACE in the context of their negative troponin testing, EKG findings, risk factors, age, and story. They accept this residual risk and wish to follow up with cardiology and PMD as outpatient. All findings discussed. Ok to discharge with home care instructions, return precautions.    Most likely gerd - pt has gi appt this friday w dr fowler

## 2022-10-19 NOTE — ED PROVIDER NOTE - NSICDXPASTMEDICALHX_GEN_ALL_CORE_FT
PAST MEDICAL HISTORY:  GERD (gastroesophageal reflux disease)     Hypertension       Hyperlipidemia

## 2022-10-19 NOTE — ED ADULT NURSE NOTE - PRIMARY CARE PROVIDER
pmd Niacinamide Pregnancy And Lactation Text: These medications are considered safe during pregnancy.

## 2022-10-19 NOTE — ED PROVIDER NOTE - PHYSICAL EXAMINATION
CONSTITUTIONAL: Non-toxic; in no apparent distress  HEAD: Normocephalic; atraumatic  EYES: PERRL; EOM intact   ENMT: External appears normal  NECK: Supple; non-tender  CARD: Normal S1, S2; no murmurs, rubs, or gallops  RESP: Normal chest excursion with respiration; breath sounds clear and equal bilaterally  ABD: Soft, non-distended; non-tender  EXT: Normal ROM in all four extremities; non-tender to palpation  SKIN: Warm, dry, no rash  NEURO:  No focal neurological deficiencies.

## 2022-10-19 NOTE — ED PROVIDER NOTE - WR INTERPRETATION DATE TIME  1
"Provider: DR DUNLAP    Caller: RAKESH URBINA    Relationship to Patient: SELF    Phone Number: [phone]    Reason for Call: PT GOT A LETTER FROM HIS INSURANCE COMPANY SAYING THAT PHYSICAL THERAPY WAS DENIED AFTER HIS SURGERY BECAUSE IT \"WASN'T MEDICALLY NECESSARY.\" HE SAID THAT THEY ARE ASKING FOR A REQUEST TO BE RE-SENT TO INSURANCE SO HE CAN POSSIBLY BE SEEN FOR PHYSICAL THERAPY.  " 19-Oct-2022 13:28

## 2022-10-19 NOTE — ED PROVIDER NOTE - PATIENT PORTAL LINK FT
You can access the FollowMyHealth Patient Portal offered by [facility] by registering at the following website: [email or web address]. By joining [employer]’s FollowMyHealth portal, you will also be able to view your health information using other applications (apps) compatible with our system.

## 2022-10-19 NOTE — ED PROVIDER NOTE - OBJECTIVE STATEMENT
76 y/o Female with a PMHx of HTN, GERD, and HLD presenting to the ED c/o burning sensation radiating up from substernal chest and into neck since 4 days ago. Has increased over past two days. Notes it is similar to prior episodes for which she was prescribed PPI by gastroenterologist. Had endoscopy in past. The pain is nonexertional and  nonpleuritic. Partially relieved by Maalox at home yesterday. No nausea, vomiting, SOB or cough. 78 y/o Female with a PMHx of HTN, GERD, and HLD presenting to the ED c/o burning sensation radiating up from substernal chest into neck since 4 days ago. Has increased over past two days. Notes it is similar to prior episodes for which she was prescribed PPI by gastroenterologist. Had endoscopy in past. The pain is nonexertional and nonpleuritic. Partially relieved by Maalox at home yesterday. No nausea, vomiting, SOB or cough.

## 2022-10-19 NOTE — ED ADULT NURSE NOTE - NSFALLRSKASSESSDT_ED_ALL_ED
Asymmetric NSHL / Pulsatile tinnitus  Stable  +loud noises exposure and hx/o abnormal audiogram 7/2017 (at work)  Audiogram (4/5/18): mod-severe on R and mild on L  MRI brain and neck (6/29/18): chronic small vessel disease, L maxillary and ethmoidal mucosal thickening.  ENT was following, f/u prn   19-Oct-2022 11:04

## 2022-10-19 NOTE — ED PROVIDER NOTE - NSFOLLOWUPINSTRUCTIONS_ED_ALL_ED_FT
Follow up with your primary medical doctor as soon as possible.  Follow up with Dr. Keys this friday as scheduled  Return to the emergency department if your symptoms worsen or if you develop new symptoms.    Chest Pain    Chest pain can be caused by many different conditions which may or may not be dangerous. Causes include heartburn, lung infections, heart attack, blood clot in lungs, skin infections, strain or damage to muscle, cartilage, or bones, etc. In addition to a history and physical examination, an electrocardiogram (ECG) or other lab tests may have been performed to determine the cause of your chest pain. Follow up with your primary care provider or with a cardiologist as instructed.     SEEK IMMEDIATE MEDICAL CARE IF YOU HAVE ANY OF THE FOLLOWING SYMPTOMS: worsening chest pain, coughing up blood, unexplained back/neck/jaw pain, severe abdominal pain, dizziness or lightheadedness, fainting, shortness of breath, sweaty or clammy skin, vomiting, or racing heart beat. These symptoms may represent a serious problem that is an emergency. Do not wait to see if the symptoms will go away. Get medical help right away. Call 911 and do not drive yourself to the hospital.

## 2022-10-19 NOTE — ED ADULT NURSE NOTE - OBJECTIVE STATEMENT
78 y/o female c/o hypertension x2 days with "burning sensation to back and chest", pt denies numbness and tingling, headaches.

## 2022-10-19 NOTE — ED PROVIDER NOTE - CARE PROVIDER_API CALL
Dc Keys  GASTROENTEROLOGY  [address], [address]  [place], [place] [address]  Phone: [phone]  Fax: [phone]  Established Patient  Follow Up Time: 1-3 Days

## 2022-10-21 DIAGNOSIS — I10 ESSENTIAL (PRIMARY) HYPERTENSION: ICD-10-CM

## 2022-10-21 DIAGNOSIS — R07.9 CHEST PAIN, UNSPECIFIED: ICD-10-CM

## 2022-10-21 DIAGNOSIS — Z20.822 CONTACT WITH AND (SUSPECTED) EXPOSURE TO COVID-19: ICD-10-CM

## 2022-10-21 DIAGNOSIS — K21.9 GASTRO-ESOPHAGEAL REFLUX DISEASE WITHOUT ESOPHAGITIS: ICD-10-CM

## 2022-10-21 DIAGNOSIS — E78.5 HYPERLIPIDEMIA, UNSPECIFIED: ICD-10-CM

## 2023-05-04 NOTE — ED PROVIDER NOTE - CARDIOVASCULAR [+], MLM
Immunizations are up to date. Encourage low-cholesterol, low-fat, calorie restricted diet. Recommend low-impact cardiovascular exercise (e.g. walking, biking, treadmill, elliptical, swimming) for 20-30 minutes at least three times a week. Encourage compliance with medications as prescribed and regular follow-up. Further evaluation, treatment, and follow-up per orders, current med list, and patient instructions.     CHEST PAIN

## 2023-09-12 NOTE — H&P ADULT - NSHPPHYSICALEXAM_GEN_ALL_CORE
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 Patient denies use of tobacco/nicotine or other illicit substances    FAMILY HISTORY:  No known pertinent family history in 1st degree relatives      MEDICATIONS  (STANDING):  influenza   Vaccine 0.5 milliLiter(s) IntraMuscular once  risperiDONE   Tablet 1 milliGRAM(s) Oral daily  risperiDONE   Tablet 2 milliGRAM(s) Oral at bedtime    MEDICATIONS  (PRN):  acetaminophen     Tablet .. 650 milliGRAM(s) Oral every 6 hours PRN Temp greater or equal to 38C (100.4F), Mild Pain (1 - 3)  aluminum hydroxide/magnesium hydroxide/simethicone Suspension 30 milliLiter(s) Oral every 6 hours PRN Dyspepsia  diphenhydrAMINE 50 milliGRAM(s) Oral every 6 hours PRN Extrapyramidal prophylaxis or symptoms  diphenhydrAMINE Injectable 50 milliGRAM(s) IntraMuscular once PRN Extrapyramidal prophylaxis  haloperidol     Tablet 5 milliGRAM(s) Oral every 6 hours PRN mild agitation  haloperidol    Injectable 5 milliGRAM(s) IntraMuscular once PRN severe agitation  LORazepam     Tablet 1 milliGRAM(s) Oral every 6 hours PRN anxiety  LORazepam     Tablet 2 milliGRAM(s) Oral every 2 hours PRN CIWA score 8 to 11  LORazepam   Injectable 2 milliGRAM(s) IntraMuscular once PRN severe anxiousness  LORazepam   Injectable 2 milliGRAM(s) IntraMuscular every 2 hours PRN CIWA score 12 or higher  magnesium hydroxide Suspension 30 milliLiter(s) Oral daily PRN Constipation  traZODone 50 milliGRAM(s) Oral at bedtime PRN insomnia      Vital Signs Last 24 Hrs  T(F): 97.8 (12 Sep 2023 16:35)  HR: 82 (12 Sep 2023 16:35)  BP: 112/78 (12 Sep 2023 16:35)  RR: --  SpO2: --      PHYSICAL EXAM:  GENERAL: NAD  HEAD:  Atraumatic, Normocephalic  EYES: Conjunctiva and sclera clear  NECK: Supple, No JVD  CHEST/LUNG: Clear to auscultation bilaterally; No wheeze, rales, rhonchi  HEART: Regular rate and rhythm; No murmurs, rubs, or gallops      Assessment and Plan:  18M 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 Patient has no acute complaints at this time - just states that he wants to go to sleep and would like writer to leave the room. Patient denies fever, chills, headache, dizziness, lightheadedness, N/V, SOB, cough, congestion, chest pain, abdominal pain, dysuria, hematuria, diarrhea, constipation. Physical exam unremarkable, VSS. Admission labs pending. F/u EKG in AM.    1.) Non-organic psychosis: Refer to primary team documentation for recs T(C): 36.6 (02-08-18 @ 06:18), Max: 36.6 (02-08-18 @ 06:18)  HR: 64 (02-08-18 @ 06:18) (45 - 88)  BP: 131/64 (02-08-18 @ 06:18) (128/60 - 183/80)  RR: 16 (02-08-18 @ 06:18) (16 - 18)  SpO2: 99% (02-08-18 @ 06:18) (97% - 100%)  Wt(kg): --    Appearance: Normal	  HEENT:   Normal oral mucosa, PERRL, EOMI	  Neck: Supple, - JVD; No Carotid Bruit and 2+ pulses B/L  Cardiovascular: Normal S1 S2, No JVD, No murmurs  Respiratory: Lungs clear to auscultation/No Rales, Rhonchi, Wheezing	  Gastrointestinal:  Soft, Non-tender, + BS	  Skin: No rashes, No ecchymoses, No cyanosis  Extremities: Normal range of motion, No clubbing, cyanosis or edema  Vascular: Femoral pulses 2+ b/l without bruit, DP 1+ b/l, PT 1+ b/l  Neurologic: Non-focal  Psychiatry: A & O x 3, Mood & affect appropriate

## 2023-12-13 NOTE — ED ADULT TRIAGE NOTE - HEART RATE (BEATS/MIN)
Follow-up with ana lilia in 3-4 weeks  Continue weekly trastuzumab/paclitaxel to complete 12 treatments   105

## 2023-12-20 NOTE — ED ADULT NURSE NOTE - PRIMARY CARE PROVIDER
[Post Op: _________] : a [unfilled] post op visit [Family Member] : family member [FreeTextEntry1] : left radiocephalic avf unknown

## 2024-02-07 NOTE — ED ADULT NURSE NOTE - PAIN RATING/NUMBER SCALE (0-10): REST
Problem: At Risk for Falls  Goal: Patient does not fall  Outcome: Monitoring/Evaluating progress  Goal: Patient takes action to control fall-related risks  Outcome: Monitoring/Evaluating progress      0

## 2024-03-08 ENCOUNTER — EMERGENCY (EMERGENCY)
Facility: HOSPITAL | Age: 79
LOS: 1 days | Discharge: HOME | End: 2024-03-08
Attending: STUDENT IN AN ORGANIZED HEALTH CARE EDUCATION/TRAINING PROGRAM | Admitting: STUDENT IN AN ORGANIZED HEALTH CARE EDUCATION/TRAINING PROGRAM
Payer: MEDICARE

## 2024-03-08 VITALS
WEIGHT: 119.93 LBS | DIASTOLIC BLOOD PRESSURE: 70 MMHG | HEART RATE: 78 BPM | RESPIRATION RATE: 18 BRPM | OXYGEN SATURATION: 97 % | TEMPERATURE: 99 F | HEIGHT: 59 IN | SYSTOLIC BLOOD PRESSURE: 175 MMHG

## 2024-03-08 DIAGNOSIS — M25.512 PAIN IN LEFT SHOULDER: ICD-10-CM

## 2024-03-08 DIAGNOSIS — R51.9 HEADACHE, UNSPECIFIED: ICD-10-CM

## 2024-03-08 DIAGNOSIS — R07.89 OTHER CHEST PAIN: ICD-10-CM

## 2024-03-08 DIAGNOSIS — I10 ESSENTIAL (PRIMARY) HYPERTENSION: ICD-10-CM

## 2024-03-08 DIAGNOSIS — E78.5 HYPERLIPIDEMIA, UNSPECIFIED: ICD-10-CM

## 2024-03-08 DIAGNOSIS — Z98.890 OTHER SPECIFIED POSTPROCEDURAL STATES: Chronic | ICD-10-CM

## 2024-03-08 DIAGNOSIS — K21.9 GASTRO-ESOPHAGEAL REFLUX DISEASE WITHOUT ESOPHAGITIS: ICD-10-CM

## 2024-03-08 LAB
ADD ON TEST-SPECIMEN IN LAB: SIGNIFICANT CHANGE UP
ANION GAP SERPL CALC-SCNC: 9 MMOL/L — SIGNIFICANT CHANGE UP (ref 5–17)
BASOPHILS # BLD AUTO: 0.04 K/UL — SIGNIFICANT CHANGE UP (ref 0–0.2)
BASOPHILS NFR BLD AUTO: 0.6 % — SIGNIFICANT CHANGE UP (ref 0–2)
BUN SERPL-MCNC: 15 MG/DL — SIGNIFICANT CHANGE UP (ref 7–23)
CALCIUM SERPL-MCNC: 9.5 MG/DL — SIGNIFICANT CHANGE UP (ref 8.4–10.5)
CHLORIDE SERPL-SCNC: 102 MMOL/L — SIGNIFICANT CHANGE UP (ref 96–108)
CK MB CFR SERPL CALC: 3.9 NG/ML — SIGNIFICANT CHANGE UP (ref 0–6.7)
CK SERPL-CCNC: 127 U/L — SIGNIFICANT CHANGE UP (ref 25–170)
CO2 SERPL-SCNC: 26 MMOL/L — SIGNIFICANT CHANGE UP (ref 22–31)
CREAT SERPL-MCNC: 0.73 MG/DL — SIGNIFICANT CHANGE UP (ref 0.5–1.3)
EGFR: 83 ML/MIN/1.73M2 — SIGNIFICANT CHANGE UP
EOSINOPHIL # BLD AUTO: 0.07 K/UL — SIGNIFICANT CHANGE UP (ref 0–0.5)
EOSINOPHIL NFR BLD AUTO: 1 % — SIGNIFICANT CHANGE UP (ref 0–6)
GLUCOSE SERPL-MCNC: 113 MG/DL — HIGH (ref 70–99)
HCT VFR BLD CALC: 40 % — SIGNIFICANT CHANGE UP (ref 34.5–45)
HGB BLD-MCNC: 13.2 G/DL — SIGNIFICANT CHANGE UP (ref 11.5–15.5)
IMM GRANULOCYTES NFR BLD AUTO: 0.4 % — SIGNIFICANT CHANGE UP (ref 0–0.9)
LYMPHOCYTES # BLD AUTO: 1.48 K/UL — SIGNIFICANT CHANGE UP (ref 1–3.3)
LYMPHOCYTES # BLD AUTO: 20.4 % — SIGNIFICANT CHANGE UP (ref 13–44)
MAGNESIUM SERPL-MCNC: 2.2 MG/DL — SIGNIFICANT CHANGE UP (ref 1.6–2.6)
MCHC RBC-ENTMCNC: 32.8 PG — SIGNIFICANT CHANGE UP (ref 27–34)
MCHC RBC-ENTMCNC: 33 GM/DL — SIGNIFICANT CHANGE UP (ref 32–36)
MCV RBC AUTO: 99.5 FL — SIGNIFICANT CHANGE UP (ref 80–100)
MONOCYTES # BLD AUTO: 0.42 K/UL — SIGNIFICANT CHANGE UP (ref 0–0.9)
MONOCYTES NFR BLD AUTO: 5.8 % — SIGNIFICANT CHANGE UP (ref 2–14)
NEUTROPHILS # BLD AUTO: 5.22 K/UL — SIGNIFICANT CHANGE UP (ref 1.8–7.4)
NEUTROPHILS NFR BLD AUTO: 71.8 % — SIGNIFICANT CHANGE UP (ref 43–77)
NRBC # BLD: 0 /100 WBCS — SIGNIFICANT CHANGE UP (ref 0–0)
PLATELET # BLD AUTO: 222 K/UL — SIGNIFICANT CHANGE UP (ref 150–400)
POTASSIUM SERPL-MCNC: 4 MMOL/L — SIGNIFICANT CHANGE UP (ref 3.5–5.3)
POTASSIUM SERPL-SCNC: 4 MMOL/L — SIGNIFICANT CHANGE UP (ref 3.5–5.3)
RBC # BLD: 4.02 M/UL — SIGNIFICANT CHANGE UP (ref 3.8–5.2)
RBC # FLD: 12 % — SIGNIFICANT CHANGE UP (ref 10.3–14.5)
SODIUM SERPL-SCNC: 137 MMOL/L — SIGNIFICANT CHANGE UP (ref 135–145)
TROPONIN T, HIGH SENSITIVITY RESULT: 10 NG/L — SIGNIFICANT CHANGE UP (ref 0–51)
WBC # BLD: 7.26 K/UL — SIGNIFICANT CHANGE UP (ref 3.8–10.5)
WBC # FLD AUTO: 7.26 K/UL — SIGNIFICANT CHANGE UP (ref 3.8–10.5)

## 2024-03-08 PROCEDURE — 93005 ELECTROCARDIOGRAM TRACING: CPT

## 2024-03-08 PROCEDURE — 70496 CT ANGIOGRAPHY HEAD: CPT | Mod: MC

## 2024-03-08 PROCEDURE — 93010 ELECTROCARDIOGRAM REPORT: CPT

## 2024-03-08 PROCEDURE — 36415 COLL VENOUS BLD VENIPUNCTURE: CPT

## 2024-03-08 PROCEDURE — 83735 ASSAY OF MAGNESIUM: CPT

## 2024-03-08 PROCEDURE — 82550 ASSAY OF CK (CPK): CPT

## 2024-03-08 PROCEDURE — 70498 CT ANGIOGRAPHY NECK: CPT | Mod: 26,MC

## 2024-03-08 PROCEDURE — 99285 EMERGENCY DEPT VISIT HI MDM: CPT | Mod: 25

## 2024-03-08 PROCEDURE — 80076 HEPATIC FUNCTION PANEL: CPT

## 2024-03-08 PROCEDURE — 70450 CT HEAD/BRAIN W/O DYE: CPT | Mod: MC

## 2024-03-08 PROCEDURE — 70450 CT HEAD/BRAIN W/O DYE: CPT | Mod: 26,MC,59

## 2024-03-08 PROCEDURE — 70496 CT ANGIOGRAPHY HEAD: CPT | Mod: 26,MC

## 2024-03-08 PROCEDURE — 71046 X-RAY EXAM CHEST 2 VIEWS: CPT | Mod: 26

## 2024-03-08 PROCEDURE — 70498 CT ANGIOGRAPHY NECK: CPT | Mod: MC

## 2024-03-08 PROCEDURE — 85025 COMPLETE CBC W/AUTO DIFF WBC: CPT

## 2024-03-08 PROCEDURE — 80048 BASIC METABOLIC PNL TOTAL CA: CPT

## 2024-03-08 PROCEDURE — 84484 ASSAY OF TROPONIN QUANT: CPT

## 2024-03-08 PROCEDURE — 82553 CREATINE MB FRACTION: CPT

## 2024-03-08 PROCEDURE — 99285 EMERGENCY DEPT VISIT HI MDM: CPT | Mod: FS

## 2024-03-08 PROCEDURE — 71046 X-RAY EXAM CHEST 2 VIEWS: CPT

## 2024-03-08 RX ORDER — PANTOPRAZOLE SODIUM 20 MG/1
1 TABLET, DELAYED RELEASE ORAL
Qty: 0 | Refills: 0 | DISCHARGE

## 2024-03-08 RX ORDER — AMLODIPINE BESYLATE 2.5 MG/1
2.5 TABLET ORAL
Qty: 0 | Refills: 0 | DISCHARGE

## 2024-03-08 RX ORDER — ATORVASTATIN CALCIUM 80 MG/1
1 TABLET, FILM COATED ORAL
Qty: 0 | Refills: 0 | DISCHARGE

## 2024-03-08 RX ORDER — NEBIVOLOL HYDROCHLORIDE 5 MG/1
1 TABLET ORAL
Qty: 0 | Refills: 0 | DISCHARGE

## 2024-03-08 NOTE — ED ADULT NURSE NOTE - OBJECTIVE STATEMENT
Pt complaints of CP, neck pain, Lt arm pain today w/ high BP, dizziness.  Pt is alert and oriented, A&O4, steady gait noted.

## 2024-03-08 NOTE — ED ADULT NURSE NOTE - SUICIDE SCREENING QUESTION 3
Impression: Myogenic ptosis of bilateral eyelids: H02.423. Plan: Discussed diagnosis in detail with patient. Discussed treatment options with patient. Will adress after excision and biopsy OS.
Impression: Neoplasm of uncertain behavior of connective and other soft tissue: D48.1. Photo Interpertation: supports clinical findings and dx documented in chart Plan: Discussed diagnosis in detail with patient. Discussed treatment options with patient. Recommend Biopsy, obtain prior authorization, further conduct after biopsy results. R/B|/A  D/W patient, seem to understand, agree and wish to proceed. Schedule excision and biopsy [id number].
No

## 2024-03-08 NOTE — ED ADULT NURSE NOTE - CAS EDN DISCHARGE INTERVENTIONS
IV discontinued, cath removed intact
I personally performed the service described in the documentation recorded by the scribe in my presence, and it accurately and completely records my words and actions.

## 2024-03-08 NOTE — ED ADULT NURSE NOTE - DRUG PRE-SCREENING (DAST -1)
Clinic Care Coordination Contact  Care Conference    S-(situation): Patient currently at [facility] for rehabilitation.    B-(background): Patient was inpatient at [facility] 12/26/21-1/4/22 for acute on chronic HFrEF exacerbation; chronic lymphedema with stasis changes; acute encephalopathy on top of dementia; new hallucinations.      A-(assessment): Clinical Product Navigator was on call with patient's daughter Stacia awaiting Care Conference, however, no TCU interdisciplinary team members joined.  Writer contacted [facility] and was informed the TCU team and patient's living facility [facility] had already had the care conference.  Writer asked for the  team to contact patient's daughter Stacia.  They are planning on placing patient on memory care unit at [facility] 1/19/22.    R-(recommendations/plan): Writer will await any further updates from TCU and contact patient/caregiver within 1-2 business days of TCU discharge.    Melissa Behl BSN, RN, PHN, [facility]  Clinical Product Navigator  [phone]    Statement Selected

## 2024-03-09 VITALS
RESPIRATION RATE: 16 BRPM | DIASTOLIC BLOOD PRESSURE: 67 MMHG | SYSTOLIC BLOOD PRESSURE: 130 MMHG | HEART RATE: 57 BPM | TEMPERATURE: 98 F | OXYGEN SATURATION: 97 %

## 2024-03-09 PROBLEM — E78.5 HYPERLIPIDEMIA, UNSPECIFIED: Chronic | Status: ACTIVE | Noted: 2022-10-19

## 2024-03-09 LAB — TROPONIN T, HIGH SENSITIVITY RESULT: 11 NG/L — SIGNIFICANT CHANGE UP (ref 0–51)

## 2024-03-09 NOTE — ED PROVIDER NOTE - ATTENDING APP SHARED VISIT CONTRIBUTION OF CARE
79 yo female with h/o GERD, HLD, HTN, in the E c/o elevated BP and headache since morning. Pt states she was checking her BP multiple times per day and BP was constantly going up. pt also c/o chest discomfort, pain to left shoulder/upper arm radiating to left side neck and left side head. Pt denies dizziness, vision changes, nausea, vomiting, sweats, fever, chills.  While in the ER pt denies any chest pain, denies weakness to her upper or lower extremities, SOB, HA.  pt has non-ischemic ecg, unremarkable exam, no focal neuro deficits,  BP remains stable. head ct, head and neck CTA  done to r/o any possible intracranial aneurysm, intracranial bleeding, mass or any other acute intracranial injury.

## 2024-03-09 NOTE — ED PROVIDER NOTE - CARE PLAN
1 Principal Discharge DX:	Transient elevated blood pressure  Secondary Diagnosis:	History of headache  Secondary Diagnosis:	Other chest pain

## 2024-03-09 NOTE — ED PROVIDER NOTE - NSFOLLOWUPINSTRUCTIONS_ED_ALL_ED_FT
Thank you for visiting [facility] Emergency Department.      Please know that no emergency visit is complete without follow-up with your primary care provider and with your cardiologist within 1 week.  Please bring copies of all discharge papers and results and show to your doctor.    Please continue taking all previous medications as instructed unless we discussed otherwise.   I appreciated your patience and hope you feel better soon.   Return to ER immediately if you develop fevers, chills, chest pain, shortness of breath, dizziness, and/or any concerning symptoms.    Nonspecific Chest Pain, Adult  Chest pain is an uncomfortable, tight, or painful feeling in the chest. The pain can feel like a crushing, aching, or squeezing pressure. A person can feel a burning or tingling sensation. Chest pain can also be felt in your back, neck, jaw, shoulder, or arm. This pain can be worse when you move, sneeze, or take a deep breath.    Chest pain can be caused by a condition that is life-threatening. This must be treated right away. It can also be caused by something that is not life-threatening. If you have chest pain, it can be hard to know the difference, so it is important to get help right away to make sure that you do not have a serious condition.    Some life-threatening causes of chest pain include:  Heart attack.  A tear in the body's main blood vessel (aortic dissection).  Inflammation around your heart (pericarditis).  A problem in the lungs, such as a blood clot (pulmonary embolism) or a collapsed lung (pneumothorax).  Some non life-threatening causes of chest pain include:  Heartburn.  Anxiety or stress.  Damage to the bones, muscles, and cartilage that make up your chest wall.  Pneumonia or bronchitis.  Shingles infection (varicella-zoster virus).  Your chest pain may come and go. It may also be constant. Your health care provider will do tests and other studies to find the cause of your pain. Treatment will depend on the cause of your chest pain.    Follow these instructions at home:  Medicines    Take over-the-counter and prescription medicines only as told by your health care provider.  If you were prescribed an antibiotic medicine, take it as told by your health care provider. Do not stop taking the antibiotic even if you start to feel better.  Activity    Avoid any activities that cause chest pain.  Do not lift anything that is heavier than 10 lb (4.5 kg), or the limit that you are told, until your health care provider says that it is safe.  Rest as directed by your health care provider.  Return to your normal activities only as told by your health care provider. Ask your health care provider what activities are safe for you.  Lifestyle    A plate along with examples of foods in a healthy diet.  Silhouette of a person sitting on the floor doing yoga.  Do not use any products that contain nicotine or tobacco, such as cigarettes, e-cigarettes, and chewing tobacco. If you need help quitting, ask your health care provider.  Do not drink alcohol.  Make healthy lifestyle changes as recommended. These may include:  Getting regular exercise. Ask your health care provider to suggest some exercises that are safe for you.  Eating a heart-healthy diet. This includes plenty of fresh fruits and vegetables, whole grains, low-fat (lean) protein, and low-fat dairy products. A dietitian can help you find healthy eating options.  Maintaining a healthy weight.  Managing any other health conditions you may have, such as high blood pressure (hypertension) or diabetes.  Reducing stress, such as with yoga or relaxation techniques.  General instructions    Pay attention to any changes in your symptoms.  It is up to you to get the results of any tests that were done. Ask your health care provider, or the department that is doing the tests, when your results will be ready.  Keep all follow-up visits as told by your health care provider. This is important.  You may be asked to go for further testing if your chest pain does not go away.  Contact a health care provider if:  Your chest pain does not go away.  You feel depressed.  You have a fever.  You notice changes in your symptoms or develop new symptoms.  Get help right away if:  Your chest pain gets worse.  You have a cough that gets worse, or you cough up blood.  You have severe pain in your abdomen.  You faint.  You have sudden, unexplained chest discomfort.  You have sudden, unexplained discomfort in your arms, back, neck, or jaw.  You have shortness of breath at any time.  You suddenly start to sweat, or your skin gets clammy.  You feel nausea or you vomit.  You suddenly feel lightheaded or dizzy.  You have severe weakness, or unexplained weakness or fatigue.  Your heart begins to beat quickly, or it feels like it is skipping beats.  These symptoms may represent a serious problem that is an emergency. Do not wait to see if the symptoms will go away. Get medical help right away. Call your local emergency services (911 in the [place] Do not drive yourself to the hospital.    Summary  Chest pain can be caused by a condition that is serious and requires urgent treatment. It may also be caused by something that is not life-threatening.  Your health care provider may do lab tests and other studies to find the cause of your pain.  Follow your health care provider's instructions on taking medicines, making lifestyle changes, and getting emergency treatment if symptoms become worse.  Keep all follow-up visits as told by your health care provider. This includes visits for any further testing if your chest pain does not go away.  This information is not intended to replace advice given to you by your health care provider. Make sure you discuss any questions you have with your health care provider.        How to Take a Blood Pressure Reading    WHAT YOU NEED TO KNOW:    What is blood pressure? Blood pressure (BP) is the force of blood pushing on the walls of your arteries. Your BP results are written as 2 numbers. The first, or top, number is called systolic BP. This is the pressure caused by your heart pushing blood out to your body. The second, or bottom, number is called diastolic BP. This is the pressure when your heart relaxes and fills back up with blood. Ask your healthcare provider what your BP should be. For most people, a good BP goal is less than 120/80.  Blood Pressure Readings    Why do I need to take BP readings? You may not have any signs or symptoms of high BP. You may need to take BP readings regularly to know how often your BP is high. High BP increases your risk for a stroke, heart attack, or kidney disease. You may need to take medicine to keep your BP at a normal level. Write down and keep a log of your BP readings. Your healthcare provider can use the results to see if your BP medicines are working.    How often should I take my BP readings? Your healthcare provider may recommend that you take your BP readings 2 times a day. Take the readings at the same times each day, such as the morning and evening.    How do I take BP readings? You can take BP readings at home with a digital BP monitor. Read the instructions that came with your BP monitor. The monitor comes with an adjustable cuff. Ask your healthcare provider if your cuff is the correct size.    Do not eat, drink, smoke, or exercise for 30 minutes before you take BP readings.    Rest quietly for 5 minutes before you begin. Do not talk while you take BP readings.    Sit with your feet flat on the floor and your back against a chair.    Put your arm straight out and support it on a flat surface. Your arm should be at chest level. Do not move your arm while you take your BP readings.    Make sure all of the air is out of the cuff. Place the BP cuff against your bare skin about 1 inch (2.5 cm) above your elbow. Wrap the cuff snugly around your arm. The BP reading may not be correct if the cuff is too loose.    If you are using a wrist cuff, wrap the cuff snugly around your wrist. Hold your wrist at the same level as your heart.    Turn on the BP monitor and follow the directions.    Write down your BP, the date, the time, and which arm you used to take BP reading. Take 2 BP readings and write down both results. Use the same arm each time. These BP readings can be 1 minute apart.  How to take a Blood Pressure  What else do I need to know?    Do not take a BP reading in an arm that is injured or has an IV or shunt. The reading may not be accurate.    Do not stop taking your medicines if your BP is at your goal. A BP at your goal means your medicine is working correctly. Take your BP medicines as directed.    Bring the BP machine to your follow-up visits. Your healthcare provider can check that you are using the machine correctly.  When should I call my doctor?    Your BP is higher or lower than you were told it should be.

## 2024-03-09 NOTE — ED PROVIDER NOTE - WHICH SHOWED
non-ischemic  head ct, head and neck CTA  done to r/o any possible intracranial aneurysm, intracranial bleeding, mass or any other acute intracranial injury.

## 2024-03-09 NOTE — ED PROVIDER NOTE - PATIENT PORTAL LINK FT
You can access the FollowMyHealth Patient Portal offered by [facility] by registering at the following website: [email or web address]. By joining [employer]’s FollowMyHealth portal, you will also be able to view your health information using other applications (apps) compatible with our system.

## 2024-03-09 NOTE — ED PROVIDER NOTE - PROGRESS NOTE DETAILS
pt with normal labs, negative trop, and no acute findings on CT/CTA.  results discussed with pt and her family. Pt is seeking discharge home. She will f/u with her doctor.

## 2024-03-09 NOTE — ED PROVIDER NOTE - MUSCULOSKELETAL, MLM
Spine  and all extremities appears normal, range of motion is not limited, no muscle or joint tenderness

## 2024-03-09 NOTE — ED PROVIDER NOTE - PSYCHIATRIC, MLM
bed rails
Alert and oriented to person, place, time/situation. normal mood and affect. no apparent risk to self or others.

## 2024-03-09 NOTE — ED PROVIDER NOTE - OBJECTIVE STATEMENT
79 yo female with h/o GERD, HLD, HTN, in the E c/o elevated BP since morning. Pt states she was checking her BP multiple times per day and BP was constantly going up. pt also c/o chest discomfort, pain to left shoulder/upper arm radiating to left side neck and left side head. Pt denies dizziness, vision changes, nausea, vomiting, sweats, fever, chills.  While in the ER pt denies any chest pain, denies weakness to her upper or lower extremities, SOB.

## 2024-05-31 NOTE — ED PROVIDER NOTE - CROS ED CARDIOVAS ALL NEG
Forehead Units: 0 Forehead Units: 4 Consent: Written consent obtained. Risks include but not limited to lid/brow ptosis, bruising, swelling, diplopia, temporary effect, incomplete chemical denervation. Additional Area 1 Location: [facility] Show Additional Area 2: Yes Show Ucl Units: No Additional Area 3 Location: Lip Flip Lot #: [id number] Post-Care Instructions: Patient instructed to not lie down for 4 hours and limit physical activity for 24 hours. Expiration Date (Month Year): 03/26 Additional Area 6 Location: Gummy smile (side of nose) Periorbital Skin Units: 12 Detail Level: Detailed Dilution (U/0.1 Cc): 2.5 Price (Use Numbers Only, No Special Characters Or $): 0.0 Additional Area 5 Location: Chin Incrementing Botox Units: By 0.5 Units Show Inventory Tab: Show Additional Area 2 Location: jelly roll Additional Area 4 Location: Jawline - - -

## 2025-05-01 NOTE — ED ADULT NURSE NOTE - DRUG PRE-SCREENING (DAST -1)
Darron Resendez is a 64 y.o. male on day 0 of admission presenting with COPD exacerbation (Multi).      Subjective   Mr. Lafleur was seen and evaluated. He reports cough with clear sputum production. He denied fever and chills. He is aware of plans to continue current management while monitoring for his clinical improvement,      Objective     Last Recorded Vitals  /74 (Patient Position: Sitting)   Pulse 92   Temp 37.5 °C (99.5 °F)   Resp 20   Wt 109 kg (239 lb 8 oz)   SpO2 96%   Intake/Output last 3 Shifts:    Intake/Output Summary (Last 24 hours) at 5/1/2025 1226  Last data filed at 5/1/2025 0915  Gross per 24 hour   Intake 520 ml   Output --   Net 520 ml       Admission Weight  Weight: 109 kg (239 lb 8 oz) (04/30/25 0822)    Daily Weight  04/30/25 : 109 kg (239 lb 8 oz)    Image Results  XR chest 2 views  Narrative: STUDY:  Chest Radiographs;  [4-30-2-25; 10:00 am]  INDICATION:  Shortness of breath.  COMPARISON:  XR chest 3-  ACCESSION NUMBER(S):  [id number]  ORDERING CLINICIAN:  ALLA DIAS  TECHNIQUE:  Frontal and lateral chest.   FINDINGS:  CARDIOMEDIASTINAL SILHOUETTE:  Cardiomediastinal silhouette is normal in size and configuration.     LUNGS:  Lungs are clear.  Emphysematous changes.     ABDOMEN:  No remarkable upper abdominal findings.     BONES:  No acute osseous changes.  Impression: No acute process.  Signed by Wali Chowdary MD      Physical Exam  Constitutional:       Appearance: Normal appearance.   HENT:      Head: Normocephalic and atraumatic.      Nose: Nose normal.      Mouth/Throat:      Mouth: Mucous membranes are dry.   Eyes:      Extraocular Movements: Extraocular movements intact.      Pupils: Pupils are equal, round, and reactive to light.   Cardiovascular:      Rate and Rhythm: Normal rate and regular rhythm.      Pulses: Normal pulses.      Heart sounds: Normal heart sounds.   Pulmonary:      Effort: Pulmonary effort is normal.      Breath sounds: Wheezing present.    Abdominal:      General: Bowel sounds are normal.      Palpations: Abdomen is soft.   Skin:     General: Skin is warm.   Neurological:      General: No focal deficit present.      Mental Status: He is alert and oriented to person, place, and time.   Psychiatric:         Mood and Affect: Mood normal.         Behavior: Behavior normal.       Relevant Results             Assessment & Plan  COPD exacerbation (Multi)  Still wheezing. Coughing up clear sputum.   Continue breathing treatmen  Continue duoneb and Pulmicort  Continue steroid as ordered  Patient has had multiple encounters with pulmonology team and their recommendation is noted. Will get pulm consult while in patient.    GERD: on PPI    Hypertension Essential:  continue lisinopril and coreg    Hyperlipidemia:  on statin      Carter Pendleton MD       Statement Selected

## 2025-05-09 NOTE — ED ADULT NURSE NOTE - NS ED NURSE LEVEL OF CONSCIOUSNESS SPEECH
"Speaking Coherently

## 2025-07-01 ENCOUNTER — EMERGENCY (EMERGENCY)
Facility: HOSPITAL | Age: 80
LOS: 1 days | End: 2025-07-01
Attending: STUDENT IN AN ORGANIZED HEALTH CARE EDUCATION/TRAINING PROGRAM | Admitting: STUDENT IN AN ORGANIZED HEALTH CARE EDUCATION/TRAINING PROGRAM
Payer: MEDICARE

## 2025-07-01 VITALS
RESPIRATION RATE: 18 BRPM | SYSTOLIC BLOOD PRESSURE: 143 MMHG | TEMPERATURE: 98 F | OXYGEN SATURATION: 98 % | DIASTOLIC BLOOD PRESSURE: 81 MMHG | HEART RATE: 66 BPM

## 2025-07-01 VITALS
SYSTOLIC BLOOD PRESSURE: 168 MMHG | TEMPERATURE: 98 F | RESPIRATION RATE: 16 BRPM | DIASTOLIC BLOOD PRESSURE: 77 MMHG | WEIGHT: 119.93 LBS | HEIGHT: 59 IN | OXYGEN SATURATION: 98 % | HEART RATE: 111 BPM

## 2025-07-01 DIAGNOSIS — Z98.890 OTHER SPECIFIED POSTPROCEDURAL STATES: Chronic | ICD-10-CM

## 2025-07-01 LAB
ANION GAP SERPL CALC-SCNC: 12 MMOL/L — SIGNIFICANT CHANGE UP (ref 5–17)
APPEARANCE UR: CLEAR — SIGNIFICANT CHANGE UP
BILIRUB UR-MCNC: NEGATIVE — SIGNIFICANT CHANGE UP
BUN SERPL-MCNC: 13 MG/DL — SIGNIFICANT CHANGE UP (ref 7–23)
CALCIUM SERPL-MCNC: 9.3 MG/DL — SIGNIFICANT CHANGE UP (ref 8.4–10.5)
CHLORIDE SERPL-SCNC: 96 MMOL/L — SIGNIFICANT CHANGE UP (ref 96–108)
CO2 SERPL-SCNC: 23 MMOL/L — SIGNIFICANT CHANGE UP (ref 22–31)
COLOR SPEC: SIGNIFICANT CHANGE UP
CREAT SERPL-MCNC: 0.68 MG/DL — SIGNIFICANT CHANGE UP (ref 0.5–1.3)
DIFF PNL FLD: ABNORMAL
EGFR: 87 ML/MIN/1.73M2 — SIGNIFICANT CHANGE UP
EGFR: 87 ML/MIN/1.73M2 — SIGNIFICANT CHANGE UP
GLUCOSE SERPL-MCNC: 109 MG/DL — HIGH (ref 70–99)
GLUCOSE UR QL: NEGATIVE MG/DL — SIGNIFICANT CHANGE UP
HCT VFR BLD CALC: 37.3 % — SIGNIFICANT CHANGE UP (ref 34.5–45)
HGB BLD-MCNC: 12.6 G/DL — SIGNIFICANT CHANGE UP (ref 11.5–15.5)
KETONES UR QL: NEGATIVE MG/DL — SIGNIFICANT CHANGE UP
LEUKOCYTE ESTERASE UR-ACNC: ABNORMAL
MCHC RBC-ENTMCNC: 33.2 PG — SIGNIFICANT CHANGE UP (ref 27–34)
MCHC RBC-ENTMCNC: 33.8 G/DL — SIGNIFICANT CHANGE UP (ref 32–36)
MCV RBC AUTO: 98.2 FL — SIGNIFICANT CHANGE UP (ref 80–100)
NITRITE UR-MCNC: NEGATIVE — SIGNIFICANT CHANGE UP
NRBC # BLD AUTO: 0 K/UL — SIGNIFICANT CHANGE UP (ref 0–0)
NRBC # FLD: 0 K/UL — SIGNIFICANT CHANGE UP (ref 0–0)
NRBC BLD AUTO-RTO: 0 /100 WBCS — SIGNIFICANT CHANGE UP (ref 0–0)
PH UR: 6 — SIGNIFICANT CHANGE UP (ref 5–8)
PLATELET # BLD AUTO: 218 K/UL — SIGNIFICANT CHANGE UP (ref 150–400)
PMV BLD: 9.5 FL — SIGNIFICANT CHANGE UP (ref 7–13)
POTASSIUM SERPL-MCNC: 3.6 MMOL/L — SIGNIFICANT CHANGE UP (ref 3.5–5.3)
POTASSIUM SERPL-SCNC: 3.6 MMOL/L — SIGNIFICANT CHANGE UP (ref 3.5–5.3)
PROT UR-MCNC: SIGNIFICANT CHANGE UP MG/DL
RBC # BLD: 3.8 M/UL — SIGNIFICANT CHANGE UP (ref 3.8–5.2)
RBC # FLD: 11.3 % — SIGNIFICANT CHANGE UP (ref 10.3–14.5)
SODIUM SERPL-SCNC: 131 MMOL/L — LOW (ref 135–145)
SP GR SPEC: 1.01 — SIGNIFICANT CHANGE UP (ref 1–1.03)
TROPONIN T, HIGH SENSITIVITY RESULT: 10 NG/L — SIGNIFICANT CHANGE UP (ref 0–51)
TROPONIN T, HIGH SENSITIVITY RESULT: 9 NG/L — SIGNIFICANT CHANGE UP (ref 0–51)
UROBILINOGEN FLD QL: 0.2 MG/DL — SIGNIFICANT CHANGE UP (ref 0.2–1)
WBC # BLD: 8.58 K/UL — SIGNIFICANT CHANGE UP (ref 3.8–10.5)
WBC # FLD AUTO: 8.58 K/UL — SIGNIFICANT CHANGE UP (ref 3.8–10.5)

## 2025-07-01 PROCEDURE — 36000 PLACE NEEDLE IN VEIN: CPT

## 2025-07-01 PROCEDURE — 87086 URINE CULTURE/COLONY COUNT: CPT

## 2025-07-01 PROCEDURE — 36415 COLL VENOUS BLD VENIPUNCTURE: CPT

## 2025-07-01 PROCEDURE — 85027 COMPLETE CBC AUTOMATED: CPT

## 2025-07-01 PROCEDURE — 81001 URINALYSIS AUTO W/SCOPE: CPT

## 2025-07-01 PROCEDURE — 99285 EMERGENCY DEPT VISIT HI MDM: CPT

## 2025-07-01 PROCEDURE — 80048 BASIC METABOLIC PNL TOTAL CA: CPT

## 2025-07-01 PROCEDURE — 99283 EMERGENCY DEPT VISIT LOW MDM: CPT | Mod: 25

## 2025-07-01 PROCEDURE — 84484 ASSAY OF TROPONIN QUANT: CPT

## 2025-07-01 RX ORDER — NITROFURANTOIN MACROCRYSTAL 100 MG
100 CAPSULE ORAL ONCE
Refills: 0 | Status: COMPLETED | OUTPATIENT
Start: 2025-07-01 | End: 2025-07-01

## 2025-07-01 RX ORDER — NITROFURANTOIN MACROCRYSTAL 100 MG
1 CAPSULE ORAL
Qty: 10 | Refills: 0
Start: 2025-07-01 | End: 2025-07-05

## 2025-07-01 RX ADMIN — Medication 1000 MILLILITER(S): at 21:23

## 2025-07-01 RX ADMIN — Medication 100 MILLIGRAM(S): at 23:01

## 2025-07-01 NOTE — ED ADULT TRIAGE NOTE - NSTRIAGECARE_GEN_A_ER
[employer] scheduled.  Class reminder letter, [employer] questionnaire, and hospital map mailed.  
EKG

## 2025-07-01 NOTE — ED PROVIDER NOTE - PROGRESS NOTE DETAILS
based on symptoms and UA, likely cystitis. delta trop negative. rest of lab unremarkable. macrobid ordered. pt updated on labs. pt agrees and understands plan. return precaution given. will dc

## 2025-07-01 NOTE — ED PROVIDER NOTE - OBJECTIVE STATEMENT
80 F, hx of HTN compliant with atenolol, HLD, GERD, p/w dysuria + hematuria x 3 days, but now noticed decreased urination. last urination 7pm but only able to produce a small amount. Also reported that her SBP at 7pm today was in the 200s. denied headache, chest pain, trouble breathing. reported burning sensation to the L neck down the arm. No hx of blood clot, pleuritic CP, active cancer, surgery in the past month, recent distance traveling, unilateral leg swelling, or OCP use

## 2025-07-01 NOTE — ED PROVIDER NOTE - CLINICAL SUMMARY MEDICAL DECISION MAKING FREE TEXT BOX
here with symptoms of UTI. will check UA. BP improved in the ED. EKG showed sinus tachy. Will r/o any end organ dmg, especially with renal function given decreased urination freq. but likely due to UTI. doesn't need to control BP now. labs, UA. abx as needed. reassess.

## 2025-07-01 NOTE — ED ADULT NURSE NOTE - NSFALLUNIVINTERV_ED_ALL_ED
Bed/Stretcher in lowest position, wheels locked, appropriate side rails in place/Call bell, personal items and telephone in reach/Instruct patient to call for assistance before getting out of bed/chair/stretcher/Non-slip footwear applied when patient is off stretcher/[place] to call system/Physically safe environment - no spills, clutter or unnecessary equipment/Purposeful proactive rounding/Room/bathroom lighting operational, light cord in reach

## 2025-07-01 NOTE — ED PROVIDER NOTE - PATIENT PORTAL LINK FT
You can access the FollowMyHealth Patient Portal offered by [facility] by registering at the following website: [email or web address]. By joining [employer]’s FollowMyHealth portal, you will also be able to view your health information using other applications (apps) compatible with our system.

## 2025-07-01 NOTE — ED PROVIDER NOTE - NSFOLLOWUPINSTRUCTIONS_ED_ALL_ED_FT
Urinary Tract Infection, Female  The female urinary system, including the kidneys, ureters, bladder, and urethra.  A urinary tract infection (UTI) is an infection in your urinary tract. The urinary tract is made up of organs that make, store, and get rid of pee (urine) in your body. These organs include:  The kidneys.  The ureters.  The bladder.  The urethra.  What are the causes?  Most UTIs are caused by germs called bacteria. They may be in or near your genitals. These germs grow and cause swelling in your urinary tract.    What increases the risk?  You're more likely to get a UTI if:  You're a female. The urethra is shorter in females than in males.  You have a soft tube called a catheter that drains your pee.  You can't control when you pee or poop.  You have trouble peeing because of:  A kidney stone.  A urinary blockage.  A nerve condition that affects your bladder.  Not getting enough to drink.  You're sexually active.  You use a birth control inside your vagina, like spermicide.  You're pregnant.  You have low levels of the hormone estrogen in your body.  You're an older adult.  You're also more likely to get a UTI if you have other health problems. These may include:  Diabetes.  A weak immune system. Your immune system is your body's defense system.  Sickle cell disease.  Injury of the spine.  What are the signs or symptoms?  Symptoms may include:  Needing to pee right away.  Peeing small amounts often.  Pain or burning when you pee.  Blood in your pee.  Pee that smells bad or odd.  Pain in your belly or lower back.  You may also:  Feel confused. This may be the first symptom in older adults.  Vomit.  Not feel hungry.  Feel tired or easily annoyed.  Have a fever or chills.  How is this diagnosed?  A UTI is diagnosed based on your medical history and an exam. You may also have other tests. These may include:  Pee tests.  Blood tests.  Tests for sexually transmitted infections (STIs).  If you've had more than one UTI, you may need to have imaging studies done to find out why you keep getting them.    How is this treated?  A UTI can be treated by:  Taking antibiotics or other medicines.  Drinking enough fluid to keep your pee pale yellow.  In rare cases, a UTI can cause a very bad condition called sepsis. Sepsis may be treated in the hospital.    Follow these instructions at home:  Medicines    Take your medicines only as told by your health care provider.  If you were given antibiotics, take them as told by your provider. Do not stop taking them even if you start to feel better.  General instructions    Make sure you:  Pee often and fully. Do not hold your pee for a long time.  Wipe from front to back after you pee or poop. Use each tissue only once when you wipe.  Pee after you have sex.  Do not douche or use sprays or powders in your genital area.  Contact a health care provider if:  Your symptoms don't get better after 1–2 days of taking antibiotics.  Your symptoms go away and then come back.  You have a fever or chills.  You vomit or feel like you may vomit.  Get help right away if:  You have very bad pain in your back or lower belly.  You faint.

## 2025-07-01 NOTE — ED ADULT NURSE NOTE - OBJECTIVE STATEMENT
80F presents to ER for hypertension (patient endorsing SBP 200s) with burning sensation when she urinates with dark urine and not a lot of urine despite drinking water. Patient A&Ox4, able to verbalize needs, follows commands, OOB independently. On RA with adequate chest rise and fall. No s/s of acute distress noted. Post-void residual US performed (see flowsheets).

## 2025-07-01 NOTE — ED ADULT TRIAGE NOTE - CHIEF COMPLAINT QUOTE
"my BP has been high this afternoon 200/100; also with burning trouble urinating for 2-3 days"; BEFAST neg; denies dizziness

## 2025-07-04 DIAGNOSIS — E78.5 HYPERLIPIDEMIA, UNSPECIFIED: ICD-10-CM

## 2025-07-04 DIAGNOSIS — R30.0 DYSURIA: ICD-10-CM

## 2025-07-04 DIAGNOSIS — I10 ESSENTIAL (PRIMARY) HYPERTENSION: ICD-10-CM

## 2025-07-04 DIAGNOSIS — N30.01 ACUTE CYSTITIS WITH HEMATURIA: ICD-10-CM

## 2025-07-22 ENCOUNTER — EMERGENCY (EMERGENCY)
Facility: HOSPITAL | Age: 80
LOS: 1 days | End: 2025-07-22
Attending: STUDENT IN AN ORGANIZED HEALTH CARE EDUCATION/TRAINING PROGRAM | Admitting: STUDENT IN AN ORGANIZED HEALTH CARE EDUCATION/TRAINING PROGRAM
Payer: MEDICARE

## 2025-07-22 VITALS
RESPIRATION RATE: 18 BRPM | TEMPERATURE: 98 F | SYSTOLIC BLOOD PRESSURE: 158 MMHG | HEART RATE: 73 BPM | OXYGEN SATURATION: 98 % | DIASTOLIC BLOOD PRESSURE: 62 MMHG

## 2025-07-22 VITALS
OXYGEN SATURATION: 97 % | SYSTOLIC BLOOD PRESSURE: 186 MMHG | TEMPERATURE: 98 F | DIASTOLIC BLOOD PRESSURE: 65 MMHG | WEIGHT: 119.93 LBS | HEART RATE: 76 BPM | HEIGHT: 59 IN | RESPIRATION RATE: 18 BRPM

## 2025-07-22 DIAGNOSIS — Z98.890 OTHER SPECIFIED POSTPROCEDURAL STATES: Chronic | ICD-10-CM

## 2025-07-22 LAB
ALBUMIN SERPL ELPH-MCNC: 4.1 G/DL — SIGNIFICANT CHANGE UP (ref 3.3–5)
ALP SERPL-CCNC: 88 U/L — SIGNIFICANT CHANGE UP (ref 40–120)
ALT FLD-CCNC: 13 U/L — SIGNIFICANT CHANGE UP (ref 10–45)
ANION GAP SERPL CALC-SCNC: 11 MMOL/L — SIGNIFICANT CHANGE UP (ref 5–17)
AST SERPL-CCNC: 22 U/L — SIGNIFICANT CHANGE UP (ref 10–40)
BASOPHILS # BLD AUTO: 0.03 K/UL — SIGNIFICANT CHANGE UP (ref 0–0.2)
BASOPHILS NFR BLD AUTO: 0.4 % — SIGNIFICANT CHANGE UP (ref 0–2)
BILIRUB SERPL-MCNC: 0.8 MG/DL — SIGNIFICANT CHANGE UP (ref 0.2–1.2)
BUN SERPL-MCNC: 11 MG/DL — SIGNIFICANT CHANGE UP (ref 7–23)
CALCIUM SERPL-MCNC: 9.6 MG/DL — SIGNIFICANT CHANGE UP (ref 8.4–10.5)
CHLORIDE SERPL-SCNC: 100 MMOL/L — SIGNIFICANT CHANGE UP (ref 96–108)
CO2 SERPL-SCNC: 24 MMOL/L — SIGNIFICANT CHANGE UP (ref 22–31)
CREAT SERPL-MCNC: 0.77 MG/DL — SIGNIFICANT CHANGE UP (ref 0.5–1.3)
EGFR: 77 ML/MIN/1.73M2 — SIGNIFICANT CHANGE UP
EGFR: 77 ML/MIN/1.73M2 — SIGNIFICANT CHANGE UP
EOSINOPHIL # BLD AUTO: 0.03 K/UL — SIGNIFICANT CHANGE UP (ref 0–0.5)
EOSINOPHIL NFR BLD AUTO: 0.4 % — SIGNIFICANT CHANGE UP (ref 0–6)
GLUCOSE SERPL-MCNC: 111 MG/DL — HIGH (ref 70–99)
HCT VFR BLD CALC: 37.1 % — SIGNIFICANT CHANGE UP (ref 34.5–45)
HGB BLD-MCNC: 12.4 G/DL — SIGNIFICANT CHANGE UP (ref 11.5–15.5)
IMM GRANULOCYTES # BLD AUTO: 0.02 K/UL — SIGNIFICANT CHANGE UP (ref 0–0.07)
IMM GRANULOCYTES NFR BLD AUTO: 0.3 % — SIGNIFICANT CHANGE UP (ref 0–0.9)
LIDOCAIN IGE QN: 52 U/L — SIGNIFICANT CHANGE UP (ref 7–60)
LYMPHOCYTES # BLD AUTO: 1.52 K/UL — SIGNIFICANT CHANGE UP (ref 1–3.3)
LYMPHOCYTES NFR BLD AUTO: 21.7 % — SIGNIFICANT CHANGE UP (ref 13–44)
MAGNESIUM SERPL-MCNC: 2 MG/DL — SIGNIFICANT CHANGE UP (ref 1.6–2.6)
MCHC RBC-ENTMCNC: 32.8 PG — SIGNIFICANT CHANGE UP (ref 27–34)
MCHC RBC-ENTMCNC: 33.4 G/DL — SIGNIFICANT CHANGE UP (ref 32–36)
MCV RBC AUTO: 98.1 FL — SIGNIFICANT CHANGE UP (ref 80–100)
MONOCYTES # BLD AUTO: 0.36 K/UL — SIGNIFICANT CHANGE UP (ref 0–0.9)
MONOCYTES NFR BLD AUTO: 5.1 % — SIGNIFICANT CHANGE UP (ref 2–14)
NEUTROPHILS # BLD AUTO: 5.05 K/UL — SIGNIFICANT CHANGE UP (ref 1.8–7.4)
NEUTROPHILS NFR BLD AUTO: 72.1 % — SIGNIFICANT CHANGE UP (ref 43–77)
NRBC # BLD AUTO: 0 K/UL — SIGNIFICANT CHANGE UP (ref 0–0)
NRBC # FLD: 0 K/UL — SIGNIFICANT CHANGE UP (ref 0–0)
NRBC BLD AUTO-RTO: 0 /100 WBCS — SIGNIFICANT CHANGE UP (ref 0–0)
PLATELET # BLD AUTO: 222 K/UL — SIGNIFICANT CHANGE UP (ref 150–400)
PMV BLD: 9.9 FL — SIGNIFICANT CHANGE UP (ref 7–13)
POTASSIUM SERPL-MCNC: 4.6 MMOL/L — SIGNIFICANT CHANGE UP (ref 3.5–5.3)
POTASSIUM SERPL-SCNC: 4.6 MMOL/L — SIGNIFICANT CHANGE UP (ref 3.5–5.3)
PROT SERPL-MCNC: 6.9 G/DL — SIGNIFICANT CHANGE UP (ref 6–8.3)
RBC # BLD: 3.78 M/UL — LOW (ref 3.8–5.2)
RBC # FLD: 11.3 % — SIGNIFICANT CHANGE UP (ref 10.3–14.5)
SODIUM SERPL-SCNC: 135 MMOL/L — SIGNIFICANT CHANGE UP (ref 135–145)
TROPONIN T, HIGH SENSITIVITY RESULT: 9 NG/L — SIGNIFICANT CHANGE UP (ref 0–51)
WBC # BLD: 7.01 K/UL — SIGNIFICANT CHANGE UP (ref 3.8–10.5)
WBC # FLD AUTO: 7.01 K/UL — SIGNIFICANT CHANGE UP (ref 3.8–10.5)

## 2025-07-22 PROCEDURE — 84484 ASSAY OF TROPONIN QUANT: CPT

## 2025-07-22 PROCEDURE — 99284 EMERGENCY DEPT VISIT MOD MDM: CPT | Mod: 25

## 2025-07-22 PROCEDURE — 96374 THER/PROPH/DIAG INJ IV PUSH: CPT

## 2025-07-22 PROCEDURE — 99285 EMERGENCY DEPT VISIT HI MDM: CPT

## 2025-07-22 PROCEDURE — 36415 COLL VENOUS BLD VENIPUNCTURE: CPT

## 2025-07-22 PROCEDURE — 70450 CT HEAD/BRAIN W/O DYE: CPT

## 2025-07-22 PROCEDURE — 80053 COMPREHEN METABOLIC PANEL: CPT

## 2025-07-22 PROCEDURE — 70450 CT HEAD/BRAIN W/O DYE: CPT | Mod: 26

## 2025-07-22 PROCEDURE — 83690 ASSAY OF LIPASE: CPT

## 2025-07-22 PROCEDURE — 83735 ASSAY OF MAGNESIUM: CPT

## 2025-07-22 PROCEDURE — 85025 COMPLETE CBC W/AUTO DIFF WBC: CPT

## 2025-07-22 PROCEDURE — 71046 X-RAY EXAM CHEST 2 VIEWS: CPT | Mod: 26

## 2025-07-22 PROCEDURE — 71046 X-RAY EXAM CHEST 2 VIEWS: CPT

## 2025-07-22 RX ORDER — MAGNESIUM, ALUMINUM HYDROXIDE 200-200 MG
30 TABLET,CHEWABLE ORAL ONCE
Refills: 0 | Status: COMPLETED | OUTPATIENT
Start: 2025-07-22 | End: 2025-07-22

## 2025-07-22 RX ORDER — ACETAMINOPHEN 500 MG/5ML
1000 LIQUID (ML) ORAL ONCE
Refills: 0 | Status: COMPLETED | OUTPATIENT
Start: 2025-07-22 | End: 2025-07-22

## 2025-07-22 RX ADMIN — Medication 30 MILLILITER(S): at 14:25

## 2025-07-22 RX ADMIN — Medication 400 MILLIGRAM(S): at 14:25

## 2025-07-22 NOTE — ED PROVIDER NOTE - OBJECTIVE STATEMENT
Patient is an 80-year-old female history of hypertension, hyperlipidemia presenting with department for headache and left-sided pain.  Patient states since last night has been having burning on the entire left side of her body, dry mouth, no appetite and intermittent left-sided chest pain.  States the headache is left-sided and burning in nature denies any visual changes weakness numbness tingling vomiting leg swelling abdominal pain neck pain or back pain.  Patient also states her blood pressure has been elevated despite being on her blood pressure medication. Patient is an 80-year-old female history of hypertension, hyperlipidemia presenting with department for headache and left-sided pain.  Patient states since Sunday has been having burning on the entire left side of her body, dry mouth, no appetite and intermittent left-sided chest pain.  States the headache is left-sided and burning in nature denies any visual changes weakness numbness tingling vomiting leg swelling abdominal pain neck pain or back pain.  Patient also states her blood pressure has been elevated despite being on her blood pressure medication.

## 2025-07-22 NOTE — ED ADULT TRIAGE NOTE - CHIEF COMPLAINT QUOTE
pt. hx of HTN, c/o "My body is burning, all down my L side since yesterday morning". Pt. c/o HA, elevated BP readings despite medication compliance, and burning, including across the chest. Denies rashes, sob, n/v/d, f/c or any neuro / focal s/s. ekg done.

## 2025-07-22 NOTE — ED PROVIDER NOTE - NSFOLLOWUPINSTRUCTIONS_ED_ALL_ED_FT
Your blood work, EKG and CAT scan were reassuring with no significant abnormalities.  You are important for you to call your primary care doctor or your cardiologist in the next few days to let them know your blood pressure has been elevated.  Please come back to emergency room for any emergent concerns.  This would be severe chest pain severe headache visual changes weakness numbness or tingling in your extremities.

## 2025-07-22 NOTE — ED ADULT NURSE NOTE - OBJECTIVE STATEMENT
80yF pmhx HTN presents to the ER complaining of Headache. Patient states that she has had headache a/w burning down left side of her back and chest since yesterday. Pt states BP has been elevated at home. Endorses decrease in appetite. Denies numbness/tingling, focal weakness, N/V/D, F/C, SOB/CP.

## 2025-07-22 NOTE — ED PROVIDER NOTE - CLINICAL SUMMARY MEDICAL DECISION MAKING FREE TEXT BOX
Patient present emerged department for headache, burning, fatigue and chest discomfort.  Patient well-appearing in no acute distress no focal neurological deficits no sensory changes on exam.  Unclear etiology possible migrainous type possible ACS possible reflux.  Low suspicion for acute aortic syndrome given lack of significant discomfort lack of neck pain, back pain, pulse differential.  Will treat symptomatically and obtain basic blood work including CT head rule out ACS.  If workup reassuring I will give her discharge home follow-up PCP.

## 2025-07-22 NOTE — ED PROVIDER NOTE - PHYSICAL EXAMINATION
VITAL SIGNS: I have reviewed nursing notes and confirm.  CONSTITUTIONAL: Well appearing, in no acute distress.   SKIN:  warm and dry, no acute rash.   HEAD:  normocephalic, atraumatic.  EYES: EOM intact; conjunctiva and sclera clear.  ENT: No nasal discharge; airway clear.   NECK: Supple.  CARD: S1, S2, Regular rate and rhythm.   RESP:  Clear to auscultation b/l, no wheezes, rales or rhonchi.  ABD:  non-distended; non-tender; no guarding/ rebound.  EXT: Normal ROM. No peripheral edema.   Equal pulses bilaterally  NEURO: Alert, oriented, grossly unremarkable strength sensation coordination and gait normal

## 2025-07-22 NOTE — ED PROVIDER NOTE - PROGRESS NOTE DETAILS
EKG normal, normal sinus rhythm normal intervals no ST elevations, depressions or T wave inversions Patient states the burning has since resolved headache is minimal at this time.  Workup reassuring will discharge patient home.

## 2025-07-24 DIAGNOSIS — R51.9 HEADACHE, UNSPECIFIED: ICD-10-CM

## 2025-07-24 DIAGNOSIS — R68.2 DRY MOUTH, UNSPECIFIED: ICD-10-CM

## 2025-07-24 DIAGNOSIS — R07.89 OTHER CHEST PAIN: ICD-10-CM

## 2025-07-24 DIAGNOSIS — E78.5 HYPERLIPIDEMIA, UNSPECIFIED: ICD-10-CM

## 2025-07-24 DIAGNOSIS — R53.83 OTHER FATIGUE: ICD-10-CM

## 2025-07-24 DIAGNOSIS — I10 ESSENTIAL (PRIMARY) HYPERTENSION: ICD-10-CM
